# Patient Record
Sex: FEMALE | Race: ASIAN | NOT HISPANIC OR LATINO | Employment: FULL TIME | URBAN - METROPOLITAN AREA
[De-identification: names, ages, dates, MRNs, and addresses within clinical notes are randomized per-mention and may not be internally consistent; named-entity substitution may affect disease eponyms.]

---

## 2021-03-11 ENCOUNTER — IMMUNIZATION (OUTPATIENT)
Dept: NURSING | Facility: CLINIC | Age: 36
End: 2021-03-11
Payer: COMMERCIAL

## 2021-03-11 PROCEDURE — 0001A PR COVID VAC PFIZER DIL RECON 30 MCG/0.3 ML IM: CPT

## 2021-03-11 PROCEDURE — 91300 PR COVID VAC PFIZER DIL RECON 30 MCG/0.3 ML IM: CPT

## 2021-04-01 ENCOUNTER — IMMUNIZATION (OUTPATIENT)
Dept: NURSING | Facility: CLINIC | Age: 36
End: 2021-04-01
Payer: COMMERCIAL

## 2021-04-01 PROCEDURE — 0002A PR COVID VAC PFIZER DIL RECON 30 MCG/0.3 ML IM: CPT

## 2021-04-01 PROCEDURE — 91300 PR COVID VAC PFIZER DIL RECON 30 MCG/0.3 ML IM: CPT

## 2021-04-24 ENCOUNTER — HEALTH MAINTENANCE LETTER (OUTPATIENT)
Age: 36
End: 2021-04-24

## 2021-10-03 ENCOUNTER — HEALTH MAINTENANCE LETTER (OUTPATIENT)
Age: 36
End: 2021-10-03

## 2022-02-28 ENCOUNTER — HOSPITAL ENCOUNTER (INPATIENT)
Facility: CLINIC | Age: 37
LOS: 3 days | Discharge: HOME | DRG: 389 | End: 2022-03-03
Attending: EMERGENCY MEDICINE | Admitting: INTERNAL MEDICINE
Payer: COMMERCIAL

## 2022-02-28 ENCOUNTER — ANCILLARY PROCEDURE (OUTPATIENT)
Dept: ULTRASOUND IMAGING | Facility: CLINIC | Age: 37
End: 2022-02-28
Attending: EMERGENCY MEDICINE
Payer: COMMERCIAL

## 2022-02-28 ENCOUNTER — APPOINTMENT (OUTPATIENT)
Dept: CT IMAGING | Facility: CLINIC | Age: 37
DRG: 389 | End: 2022-02-28
Attending: EMERGENCY MEDICINE
Payer: COMMERCIAL

## 2022-02-28 DIAGNOSIS — I95.89 CHRONIC LOW BLOOD PRESSURE: ICD-10-CM

## 2022-02-28 DIAGNOSIS — K56.609 SMALL BOWEL OBSTRUCTION (H): ICD-10-CM

## 2022-02-28 LAB
ABO/RH(D): NORMAL
ALBUMIN SERPL-MCNC: 4.2 G/DL (ref 3.4–5)
ALP SERPL-CCNC: 47 U/L (ref 40–150)
ALT SERPL W P-5'-P-CCNC: 19 U/L (ref 0–50)
ANION GAP SERPL CALCULATED.3IONS-SCNC: 6 MMOL/L (ref 3–14)
ANTIBODY SCREEN: NEGATIVE
AST SERPL W P-5'-P-CCNC: 15 U/L (ref 0–45)
B-HCG FREE SERPL-ACNC: <5 IU/L (ref 0–5)
BASOPHILS # BLD AUTO: 0 10E3/UL (ref 0–0.2)
BASOPHILS NFR BLD AUTO: 0 %
BILIRUB SERPL-MCNC: 0.4 MG/DL (ref 0.2–1.3)
BUN SERPL-MCNC: 11 MG/DL (ref 7–30)
CALCIUM SERPL-MCNC: 9.5 MG/DL (ref 8.5–10.1)
CHLORIDE BLD-SCNC: 104 MMOL/L (ref 94–109)
CO2 SERPL-SCNC: 27 MMOL/L (ref 20–32)
CREAT BLD-MCNC: 0.5 MG/DL (ref 0.5–1)
CREAT SERPL-MCNC: 0.59 MG/DL (ref 0.52–1.04)
EOSINOPHIL # BLD AUTO: 0.1 10E3/UL (ref 0–0.7)
EOSINOPHIL NFR BLD AUTO: 1 %
ERYTHROCYTE [DISTWIDTH] IN BLOOD BY AUTOMATED COUNT: 14.1 % (ref 10–15)
GFR SERPL CREATININE-BSD FRML MDRD: >60 ML/MIN/1.73M2
GFR SERPL CREATININE-BSD FRML MDRD: >90 ML/MIN/1.73M2
GLUCOSE BLD-MCNC: 100 MG/DL (ref 70–99)
HCT VFR BLD AUTO: 35.5 % (ref 35–47)
HGB BLD-MCNC: 11.4 G/DL (ref 11.7–15.7)
HOLD SPECIMEN: NORMAL
HOLD SPECIMEN: NORMAL
IMM GRANULOCYTES # BLD: 0 10E3/UL
IMM GRANULOCYTES NFR BLD: 0 %
LACTATE SERPL-SCNC: 1.1 MMOL/L (ref 0.7–2)
LIPASE SERPL-CCNC: 103 U/L (ref 73–393)
LYMPHOCYTES # BLD AUTO: 1.7 10E3/UL (ref 0.8–5.3)
LYMPHOCYTES NFR BLD AUTO: 18 %
MCH RBC QN AUTO: 27 PG (ref 26.5–33)
MCHC RBC AUTO-ENTMCNC: 32.1 G/DL (ref 31.5–36.5)
MCV RBC AUTO: 84 FL (ref 78–100)
MONOCYTES # BLD AUTO: 0.4 10E3/UL (ref 0–1.3)
MONOCYTES NFR BLD AUTO: 4 %
NEUTROPHILS # BLD AUTO: 7.3 10E3/UL (ref 1.6–8.3)
NEUTROPHILS NFR BLD AUTO: 77 %
NRBC # BLD AUTO: 0 10E3/UL
NRBC BLD AUTO-RTO: 0 /100
PLATELET # BLD AUTO: 308 10E3/UL (ref 150–450)
POTASSIUM BLD-SCNC: 3.6 MMOL/L (ref 3.4–5.3)
PROT SERPL-MCNC: 8.1 G/DL (ref 6.8–8.8)
RBC # BLD AUTO: 4.23 10E6/UL (ref 3.8–5.2)
SARS-COV-2 RNA RESP QL NAA+PROBE: NEGATIVE
SODIUM SERPL-SCNC: 137 MMOL/L (ref 133–144)
SPECIMEN EXPIRATION DATE: NORMAL
WBC # BLD AUTO: 9.6 10E3/UL (ref 4–11)

## 2022-02-28 PROCEDURE — 87635 SARS-COV-2 COVID-19 AMP PRB: CPT | Performed by: EMERGENCY MEDICINE

## 2022-02-28 PROCEDURE — 99285 EMERGENCY DEPT VISIT HI MDM: CPT | Mod: 25

## 2022-02-28 PROCEDURE — 96376 TX/PRO/DX INJ SAME DRUG ADON: CPT

## 2022-02-28 PROCEDURE — 250N000011 HC RX IP 250 OP 636: Performed by: EMERGENCY MEDICINE

## 2022-02-28 PROCEDURE — C9803 HOPD COVID-19 SPEC COLLECT: HCPCS

## 2022-02-28 PROCEDURE — 36415 COLL VENOUS BLD VENIPUNCTURE: CPT | Performed by: EMERGENCY MEDICINE

## 2022-02-28 PROCEDURE — 99223 1ST HOSP IP/OBS HIGH 75: CPT | Mod: AI | Performed by: NURSE PRACTITIONER

## 2022-02-28 PROCEDURE — 86850 RBC ANTIBODY SCREEN: CPT | Performed by: EMERGENCY MEDICINE

## 2022-02-28 PROCEDURE — 258N000003 HC RX IP 258 OP 636: Performed by: EMERGENCY MEDICINE

## 2022-02-28 PROCEDURE — 86901 BLOOD TYPING SEROLOGIC RH(D): CPT | Performed by: EMERGENCY MEDICINE

## 2022-02-28 PROCEDURE — 82565 ASSAY OF CREATININE: CPT

## 2022-02-28 PROCEDURE — 83605 ASSAY OF LACTIC ACID: CPT | Performed by: EMERGENCY MEDICINE

## 2022-02-28 PROCEDURE — 84702 CHORIONIC GONADOTROPIN TEST: CPT

## 2022-02-28 PROCEDURE — 96375 TX/PRO/DX INJ NEW DRUG ADDON: CPT

## 2022-02-28 PROCEDURE — 76705 ECHO EXAM OF ABDOMEN: CPT

## 2022-02-28 PROCEDURE — 80053 COMPREHEN METABOLIC PANEL: CPT | Performed by: EMERGENCY MEDICINE

## 2022-02-28 PROCEDURE — 85025 COMPLETE CBC W/AUTO DIFF WBC: CPT | Performed by: EMERGENCY MEDICINE

## 2022-02-28 PROCEDURE — 96374 THER/PROPH/DIAG INJ IV PUSH: CPT

## 2022-02-28 PROCEDURE — 82040 ASSAY OF SERUM ALBUMIN: CPT | Performed by: EMERGENCY MEDICINE

## 2022-02-28 PROCEDURE — 96361 HYDRATE IV INFUSION ADD-ON: CPT

## 2022-02-28 PROCEDURE — 74177 CT ABD & PELVIS W/CONTRAST: CPT

## 2022-02-28 PROCEDURE — 81001 URINALYSIS AUTO W/SCOPE: CPT | Performed by: EMERGENCY MEDICINE

## 2022-02-28 PROCEDURE — 120N000001 HC R&B MED SURG/OB

## 2022-02-28 PROCEDURE — 250N000009 HC RX 250: Performed by: EMERGENCY MEDICINE

## 2022-02-28 PROCEDURE — 83690 ASSAY OF LIPASE: CPT | Performed by: EMERGENCY MEDICINE

## 2022-02-28 RX ORDER — IOPAMIDOL 755 MG/ML
500 INJECTION, SOLUTION INTRAVASCULAR ONCE
Status: COMPLETED | OUTPATIENT
Start: 2022-02-28 | End: 2022-02-28

## 2022-02-28 RX ORDER — ONDANSETRON 2 MG/ML
4 INJECTION INTRAMUSCULAR; INTRAVENOUS ONCE
Status: COMPLETED | OUTPATIENT
Start: 2022-02-28 | End: 2022-02-28

## 2022-02-28 RX ORDER — SODIUM CHLORIDE 9 MG/ML
INJECTION, SOLUTION INTRAVENOUS ONCE
Status: COMPLETED | OUTPATIENT
Start: 2022-02-28 | End: 2022-03-01

## 2022-02-28 RX ORDER — HYDROMORPHONE HYDROCHLORIDE 1 MG/ML
0.5 INJECTION, SOLUTION INTRAMUSCULAR; INTRAVENOUS; SUBCUTANEOUS EVERY 30 MIN PRN
Status: DISCONTINUED | OUTPATIENT
Start: 2022-02-28 | End: 2022-03-03 | Stop reason: HOSPADM

## 2022-02-28 RX ADMIN — SODIUM CHLORIDE 1000 ML: 9 INJECTION, SOLUTION INTRAVENOUS at 19:04

## 2022-02-28 RX ADMIN — HYDROMORPHONE HYDROCHLORIDE 0.5 MG: 1 INJECTION, SOLUTION INTRAMUSCULAR; INTRAVENOUS; SUBCUTANEOUS at 23:37

## 2022-02-28 RX ADMIN — SODIUM CHLORIDE 59 ML: 9 INJECTION, SOLUTION INTRAVENOUS at 18:36

## 2022-02-28 RX ADMIN — SODIUM CHLORIDE: 9 INJECTION, SOLUTION INTRAVENOUS at 20:25

## 2022-02-28 RX ADMIN — HYDROMORPHONE HYDROCHLORIDE 0.5 MG: 1 INJECTION, SOLUTION INTRAMUSCULAR; INTRAVENOUS; SUBCUTANEOUS at 21:13

## 2022-02-28 RX ADMIN — ONDANSETRON 4 MG: 2 INJECTION INTRAMUSCULAR; INTRAVENOUS at 18:06

## 2022-02-28 RX ADMIN — HYDROMORPHONE HYDROCHLORIDE 1 MG: 1 INJECTION, SOLUTION INTRAMUSCULAR; INTRAVENOUS; SUBCUTANEOUS at 18:19

## 2022-02-28 RX ADMIN — IOPAMIDOL 74 ML: 755 INJECTION, SOLUTION INTRAVENOUS at 18:35

## 2022-02-28 RX ADMIN — SODIUM CHLORIDE 1000 ML: 9 INJECTION, SOLUTION INTRAVENOUS at 18:19

## 2022-02-28 RX ADMIN — ONDANSETRON 4 MG: 2 INJECTION INTRAMUSCULAR; INTRAVENOUS at 23:55

## 2022-02-28 ASSESSMENT — ACTIVITIES OF DAILY LIVING (ADL)
ADLS_ACUITY_SCORE: 12

## 2022-02-28 NOTE — ED PROVIDER NOTES
History     Chief Complaint:  Abdominal Pain and Vomiting       HPI   Franci Wu is a 36 year old female who presents with her  for evaluation of sudden onset 101/10 upper abdominal pain that started around 1:00-1:30pm today.  Not associated with activity or eating.  Tried omeprazole without relief.  Vomited a few times.  Seemed distended which improved after vomiting.  No diarrhea.  No fever, cough, shortness of breath, chest pain.  Patient is moaning.  Most of history provided by  due to level of patient's pain.  No previous abdominal or pelvic surgeries.  No previous occurrence of symptoms.  No associated urinary symptoms.  Vaginal spotting for 4-5 days and it is not time for her period.  Feeling dizzy/lightheaded.    ROS:  Review of Systems  Ten system ROS reviewed and is negative except as above      Allergies:  Minocycline     Medications:    acetaminophen 650 MG TABS  Ferrous Sulfate (IRON SUPPLEMENT PO)  ibuprofen (ADVIL,MOTRIN) 400 MG tablet  oxyCODONE (ROXICODONE) 5 MG immediate release tablet  Prenatal Vit w/Vw-Zucqyhbqw-NN (PNV PO)  VITAMIN D, CHOLECALCIFEROL, PO        Past Medical History:    Past Medical History:   Diagnosis Date     Anemia      Patient Active Problem List   Diagnosis     Indication for care in labor or delivery        Past Surgical History:    Denies      Social History:   reports that she has never smoked. She does not have any smokeless tobacco history on file. She reports that she does not drink alcohol and does not use drugs.  PCP: Alessandra Verde     Physical Exam     Patient Vitals for the past 24 hrs:   BP Temp Temp src Pulse Resp SpO2   02/28/22 1755 (!) 86/33 -- -- -- -- --   02/28/22 1749 -- 97.2  F (36.2  C) Temporal 70 12 100 %        Physical Exam  Eyes:  Sclera white; Pupils are equal and round  ENT:    External ears and nares normal  CV:  Rate as above with regular rhythm   Resp:  Breath sounds clear and equal bilaterally    Non-labored, no  retractions or accessory muscle use  GI:  Abdomen is soft, maximal tenderness epigastric and RUQ with light palpation, diffuse tenderness with deeper palpation    No rebound tenderness or peritoneal features  MS:  Moves all extremities  Skin:  Warm and dry  Neuro:  Speech is normal and fluent. Moaning.    Emergency Department Course       Imaging:  CT Abdomen Pelvis w Contrast   Final Result   IMPRESSION:    1.  Finding suggests small bowel obstruction with multiple dilated proximal to mid small bowel loops and decompressed distal small bowel. Transition to decompression at the right lower quadrant noted where there are a few wall thickened small bowel    loops. This could relate to an infectious or inflammatory enteritis versus other etiologies.   2.  Small pelvic fluid.      POC US ABDOMEN LIMITED   Final Result   Limited Bedside Abdominal Ultrasound      Performed by: Dr. Matamoros   Indication: Acute pain; hypotensive   Body area(s) imaged: RUQ, LUQ, pelvic windows   Findings: No free fluid, limited single view of gallbladder without pericholecystic fluid or gallstones, no hydronephrosis   Impression: Same   Images archived to PACS            Report per radiology    Laboratory:  Labs Ordered and Resulted from Time of ED Arrival to Time of ED Departure   COMPREHENSIVE METABOLIC PANEL - Abnormal       Result Value    Sodium 137      Potassium 3.6      Chloride 104      Carbon Dioxide (CO2) 27      Anion Gap 6      Urea Nitrogen 11      Creatinine 0.59      Calcium 9.5      Glucose 100 (*)     Alkaline Phosphatase 47      AST 15      ALT 19      Protein Total 8.1      Albumin 4.2      Bilirubin Total 0.4      GFR Estimate >90     CBC WITH PLATELETS AND DIFFERENTIAL - Abnormal    WBC Count 9.6      RBC Count 4.23      Hemoglobin 11.4 (*)     Hematocrit 35.5      MCV 84      MCH 27.0      MCHC 32.1      RDW 14.1      Platelet Count 308      % Neutrophils 77      % Lymphocytes 18      % Monocytes 4      % Eosinophils 1       % Basophils 0      % Immature Granulocytes 0      NRBCs per 100 WBC 0      Absolute Neutrophils 7.3      Absolute Lymphocytes 1.7      Absolute Monocytes 0.4      Absolute Eosinophils 0.1      Absolute Basophils 0.0      Absolute Immature Granulocytes 0.0      Absolute NRBCs 0.0     LIPASE - Normal    Lipase 103     LACTIC ACID WHOLE BLOOD - Normal    Lactic Acid 1.1     COVID-19 VIRUS (CORONAVIRUS) BY PCR - Normal    SARS CoV2 PCR Negative     ISTAT CREATININE POCT - Normal    Creatinine POCT 0.5      GFR, ESTIMATED POCT >60     ISTAT HCG QUANTITATIVE PREGNANCY POCT - Normal    HCG QUANTITATIVE POCT <5.0     ROUTINE UA WITH MICROSCOPIC   TYPE AND SCREEN, ADULT    ABO/RH(D) A POS      Antibody Screen Negative      SPECIMEN EXPIRATION DATE [id number]     ABO/RH TYPE AND SCREEN        Procedures   POC US as above    Emergency Department Course:    Interventions:  Medications   HYDROmorphone (PF) (DILAUDID) injection 0.5 mg (0.5 mg Intravenous Given 2/28/22 2113)   0.9% sodium chloride BOLUS (0 mLs Intravenous Stopped 2/28/22 1900)   HYDROmorphone (DILAUDID) injection 1 mg (1 mg Intravenous Given 2/28/22 1819)   ondansetron (ZOFRAN) injection 4 mg (4 mg Intravenous Given 2/28/22 1806)   CT Scan Flush (59 mLs Intravenous Given 2/28/22 1836)   iopamidol (ISOVUE-370) solution 500 mL (74 mLs Intravenous Given 2/28/22 1835)   0.9% sodium chloride BOLUS (0 mLs Intravenous Stopped 2/28/22 2113)   sodium chloride 0.9% infusion ( Intravenous Rate/Dose Verify 2/28/22 2259)        Disposition:  The patient was admitted to the hospital under the care of Dr. Peñaloza, I spoke with Ulysses Garrett NP at 7:52pm who also assessed the patient in the ED at 8:00pm prior to admission.     Impression & Plan      Medical Decision Making:  Roomed to fast track due to it being the only available room and she is hypotensive on arrival.  Fluids and labs started.  Zofran and Dilaudid ordered.  Istat HCG negative ruling out ectopic as an etiology  of symptoms.  POC US without free fluid or visualized hydronephrosis.  Gallbladder images limited, single view without obvious wall thickening and no visualized stone.  CT concerning for small bowel obstruction.  No history of abdominal or pelvic surgeries, so is not anticipated to have adhesions as a source.  No mass noted in the area.  Has not vomited while in ED and is not distended.  Will keep NPO and on IV fluids.  If vomiting or distension worsen, then NG tube.  On review of CareEverywhere patient has past blood pressures in the 90s at regular visits, 98/62 on 3/18/21 during a visit for her knee, 97/64 on 1/15/20 during a visit for irregular periods, and 101/67 on 8/28/15 at a post-partum visit.  Lowest BPs here were 70s.  These have improved after IV fluids.  Given her past history of chronic low blood pressures without acute sepsis or bleeding, is felt by myself and the hospitalist team to be appropriate for med/surg admission.      Signed out to Dr. Madrid pending inpatient bed availability.  Boarding anticipated.    Diagnosis:    ICD-10-CM    1. Small bowel obstruction (H)  K56.609    2. Chronic low blood pressure  I95.89            Aishwarya Matamoros MD  02/28/22 2242

## 2022-02-28 NOTE — ED TRIAGE NOTES
Pt presents with upper mid abdominal pain that began today around 1300. Since, pt has had 3 episodes of vomiting. Denies diarrhea. Dizzy/lightheaded and feels like passing out. Has been spotting x4-5 days, not on period.

## 2022-03-01 ENCOUNTER — APPOINTMENT (OUTPATIENT)
Dept: GENERAL RADIOLOGY | Facility: CLINIC | Age: 37
DRG: 389 | End: 2022-03-01
Attending: SURGERY
Payer: COMMERCIAL

## 2022-03-01 PROBLEM — I95.89 CHRONIC LOW BLOOD PRESSURE: Status: ACTIVE | Noted: 2022-03-01

## 2022-03-01 LAB
ALBUMIN UR-MCNC: 10 MG/DL
APPEARANCE UR: CLEAR
BILIRUB UR QL STRIP: NEGATIVE
COLOR UR AUTO: ABNORMAL
GLUCOSE UR STRIP-MCNC: NEGATIVE MG/DL
HGB UR QL STRIP: ABNORMAL
KETONES UR STRIP-MCNC: 80 MG/DL
LEUKOCYTE ESTERASE UR QL STRIP: ABNORMAL
MUCOUS THREADS #/AREA URNS LPF: PRESENT /LPF
NITRATE UR QL: NEGATIVE
PH UR STRIP: 7 [PH] (ref 5–7)
RBC URINE: 3 /HPF
SP GR UR STRIP: 1 (ref 1–1.03)
SQUAMOUS EPITHELIAL: 3 /HPF
UROBILINOGEN UR STRIP-MCNC: NORMAL MG/DL
WBC URINE: 11 /HPF

## 2022-03-01 PROCEDURE — 250N000011 HC RX IP 250 OP 636: Performed by: NURSE PRACTITIONER

## 2022-03-01 PROCEDURE — 74018 RADEX ABDOMEN 1 VIEW: CPT

## 2022-03-01 PROCEDURE — 258N000003 HC RX IP 258 OP 636: Performed by: HOSPITALIST

## 2022-03-01 PROCEDURE — 120N000001 HC R&B MED SURG/OB

## 2022-03-01 PROCEDURE — 258N000003 HC RX IP 258 OP 636: Performed by: INTERNAL MEDICINE

## 2022-03-01 PROCEDURE — 250N000009 HC RX 250: Performed by: HOSPITALIST

## 2022-03-01 PROCEDURE — 99233 SBSQ HOSP IP/OBS HIGH 50: CPT | Performed by: HOSPITALIST

## 2022-03-01 PROCEDURE — 250N000011 HC RX IP 250 OP 636: Performed by: HOSPITALIST

## 2022-03-01 PROCEDURE — 99222 1ST HOSP IP/OBS MODERATE 55: CPT | Performed by: SURGERY

## 2022-03-01 PROCEDURE — 250N000011 HC RX IP 250 OP 636: Performed by: EMERGENCY MEDICINE

## 2022-03-01 PROCEDURE — 96376 TX/PRO/DX INJ SAME DRUG ADON: CPT

## 2022-03-01 RX ORDER — DEXTROSE MONOHYDRATE, SODIUM CHLORIDE, AND POTASSIUM CHLORIDE 50; 1.49; 4.5 G/1000ML; G/1000ML; G/1000ML
INJECTION, SOLUTION INTRAVENOUS CONTINUOUS
Status: ACTIVE | OUTPATIENT
Start: 2022-03-01 | End: 2022-03-02

## 2022-03-01 RX ORDER — CEFTRIAXONE 1 G/1
1 INJECTION, POWDER, FOR SOLUTION INTRAMUSCULAR; INTRAVENOUS EVERY 24 HOURS
Status: DISCONTINUED | OUTPATIENT
Start: 2022-03-01 | End: 2022-03-03

## 2022-03-01 RX ORDER — LIDOCAINE 40 MG/G
CREAM TOPICAL
Status: DISCONTINUED | OUTPATIENT
Start: 2022-03-01 | End: 2022-03-03 | Stop reason: HOSPADM

## 2022-03-01 RX ORDER — ONDANSETRON 2 MG/ML
4 INJECTION INTRAMUSCULAR; INTRAVENOUS EVERY 6 HOURS PRN
Status: DISCONTINUED | OUTPATIENT
Start: 2022-03-01 | End: 2022-03-03 | Stop reason: HOSPADM

## 2022-03-01 RX ORDER — SODIUM CHLORIDE, SODIUM LACTATE, POTASSIUM CHLORIDE, CALCIUM CHLORIDE 600; 310; 30; 20 MG/100ML; MG/100ML; MG/100ML; MG/100ML
INJECTION, SOLUTION INTRAVENOUS CONTINUOUS
Status: DISCONTINUED | OUTPATIENT
Start: 2022-03-01 | End: 2022-03-01

## 2022-03-01 RX ORDER — ACETAMINOPHEN 325 MG/1
975 TABLET ORAL EVERY 8 HOURS PRN
Status: DISCONTINUED | OUTPATIENT
Start: 2022-03-01 | End: 2022-03-03 | Stop reason: HOSPADM

## 2022-03-01 RX ORDER — SODIUM CHLORIDE 9 MG/ML
INJECTION, SOLUTION INTRAVENOUS CONTINUOUS
Status: DISCONTINUED | OUTPATIENT
Start: 2022-03-01 | End: 2022-03-01

## 2022-03-01 RX ORDER — KETOROLAC TROMETHAMINE 30 MG/ML
30 INJECTION, SOLUTION INTRAMUSCULAR; INTRAVENOUS EVERY 6 HOURS PRN
Status: DISCONTINUED | OUTPATIENT
Start: 2022-03-01 | End: 2022-03-03 | Stop reason: HOSPADM

## 2022-03-01 RX ADMIN — FAMOTIDINE 20 MG: 10 INJECTION, SOLUTION INTRAVENOUS at 11:49

## 2022-03-01 RX ADMIN — FAMOTIDINE 20 MG: 10 INJECTION, SOLUTION INTRAVENOUS at 23:54

## 2022-03-01 RX ADMIN — SODIUM CHLORIDE: 9 INJECTION, SOLUTION INTRAVENOUS at 10:37

## 2022-03-01 RX ADMIN — SODIUM CHLORIDE: 9 INJECTION, SOLUTION INTRAVENOUS at 16:22

## 2022-03-01 RX ADMIN — CEFTRIAXONE 1 G: 1 INJECTION, POWDER, FOR SOLUTION INTRAMUSCULAR; INTRAVENOUS at 10:46

## 2022-03-01 RX ADMIN — HYDROMORPHONE HYDROCHLORIDE 0.5 MG: 1 INJECTION, SOLUTION INTRAMUSCULAR; INTRAVENOUS; SUBCUTANEOUS at 03:21

## 2022-03-01 RX ADMIN — HYDROMORPHONE HYDROCHLORIDE 0.5 MG: 1 INJECTION, SOLUTION INTRAMUSCULAR; INTRAVENOUS; SUBCUTANEOUS at 05:33

## 2022-03-01 RX ADMIN — POTASSIUM CHLORIDE, DEXTROSE MONOHYDRATE AND SODIUM CHLORIDE: 150; 5; 450 INJECTION, SOLUTION INTRAVENOUS at 17:34

## 2022-03-01 RX ADMIN — SODIUM CHLORIDE 1000 ML: 9 INJECTION, SOLUTION INTRAVENOUS at 11:06

## 2022-03-01 RX ADMIN — ONDANSETRON 4 MG: 2 INJECTION INTRAMUSCULAR; INTRAVENOUS at 16:25

## 2022-03-01 RX ADMIN — SODIUM CHLORIDE, POTASSIUM CHLORIDE, SODIUM LACTATE AND CALCIUM CHLORIDE: 600; 310; 30; 20 INJECTION, SOLUTION INTRAVENOUS at 09:23

## 2022-03-01 RX ADMIN — KETOROLAC TROMETHAMINE 30 MG: 30 INJECTION, SOLUTION INTRAMUSCULAR at 16:26

## 2022-03-01 RX ADMIN — DIATRIZOATE MEGLUMINE AND DIATRIZOATE SODIUM 75 ML: 660; 100 SOLUTION ORAL; RECTAL at 15:48

## 2022-03-01 RX ADMIN — SODIUM CHLORIDE 1000 ML: 9 INJECTION, SOLUTION INTRAVENOUS at 07:07

## 2022-03-01 ASSESSMENT — ACTIVITIES OF DAILY LIVING (ADL)
ADLS_ACUITY_SCORE: 12
FALL_HISTORY_WITHIN_LAST_SIX_MONTHS: NO
DOING_ERRANDS_INDEPENDENTLY_DIFFICULTY: NO
ADLS_ACUITY_SCORE: 12
ADLS_ACUITY_SCORE: 3
TOILETING_ISSUES: NO
CHANGE_IN_FUNCTIONAL_STATUS_SINCE_ONSET_OF_CURRENT_ILLNESS/INJURY: NO
ADLS_ACUITY_SCORE: 12
ADLS_ACUITY_SCORE: 5
WALKING_OR_CLIMBING_STAIRS_DIFFICULTY: NO
ADLS_ACUITY_SCORE: 12
WEAR_GLASSES_OR_BLIND: NO
DIFFICULTY_EATING/SWALLOWING: NO
ADLS_ACUITY_SCORE: 5
HEARING_DIFFICULTY_OR_DEAF: NO
ADLS_ACUITY_SCORE: 12
DIFFICULTY_COMMUNICATING: NO
ADLS_ACUITY_SCORE: 12
ADLS_ACUITY_SCORE: 3
ADLS_ACUITY_SCORE: 5
ADLS_ACUITY_SCORE: 5
CONCENTRATING,_REMEMBERING_OR_MAKING_DECISIONS_DIFFICULTY: NO
ADLS_ACUITY_SCORE: 5
ADLS_ACUITY_SCORE: 5
DRESSING/BATHING_DIFFICULTY: NO
ADLS_ACUITY_SCORE: 12

## 2022-03-01 NOTE — H&P
[facility]    History and Physical - Hospitalist Service       Date of Admission:  2/28/2022    Summary: Admitted due to acute abdominal pain today with N/V that initially relieved pain, but now makes worse.  Endorses BM yesterday, no flatus/BM today.       Assessment & Plan          # Acute abdominal pain  # Suspected SBO  # RLQ wall thickening on CT  Reports doing well yesterday and this AM, acute issues starting this afternoon, after lunch.  Notes WNL BM's yesterday, no black/red stools, no BM/Flatus today.  Endorses N/V today with just food, no blood.  Is active, denies being sedentary.  Etiology unclear, CT notes findings suggest SBO, with RLQ wall thickening in some small bowel loops, but afebrile, no diarrhea.   I note CT does show moderate stool burden, constipation?    -Currently NPO.   -Currently on IVF's.     *Due to hospital census, patient will remain housed in ER for now, transition to floor time unknown.   -Once on floor, reassess abdomen.   -Consider NGT and or Bisacodyl suppository for compression.   -Ambulation schedule.   --If not improved, consider Gen Surg review and/or Gastrografin enema.   -PRN Acetaminophen and PRN IV Dilaudid ordered/held.     # Hypotensive  Initially SBP's were 70's and she endorsed light headedness/dizziness, but improved with IVF's.  In review of EMR, SBP of 90's is her baseline.  Lactate WNL, WNL WBC, afebrile, so lower suspicion for infectious etiology at this time.   -Continue IVF's.   -Continue to clinically monitor.     # Vaginal spotting  Endorses some vaginal spotting, has a history of vaginal spotting.  Reports some dysuria a few days ago, none lately.  UA is unremarkable.   -No changes, continue to clinically monitor.     Diet: NPO for Medical/Clinical Reasons Except for: Ice Chips    DVT Prophylaxis: Low Risk/Ambulatory with no VTE prophylaxis indicated  [place]iff Catheter: Not present  Central Lines: None  Code Status:   Full     Disposition  Plan    -Return home once medically improved.     The patient's care was discussed with the Attending Physician, Dr. Aguilar.     Signed:     KEMAR Hyde [facility]  Hospitalist Service  [facility]  Securely message with the Vocera Web Console (learn more here)  Text page via [facility] Paging/Directory     Chief Complaint   History is obtained from the patient and .     Acute abdominal pain starting this afternoon with N/V.     History of Present Illness   Franci Wu is a 36 year old female who has minimal PMH to include pregnancy and vaginal spotting.  Reports no issues yesterday and this morning, had lunch.  Starting around 1 pm, acute, severe abdominal pain and N/V.  Initially N/V helped pain, but last few times, was worse.  No melena, BRBPR, no blood in emesis.  Reports dysuria a few days ago, none now.  Does endorse some mild vaginal spotting.  Reports she has never had this issue before.     In meeting Franci Wu she reports no food since lunch.  Reports the IV Dilaudid really helped her abdominal pain.  Endorses no flatus/BM today.  Besides her abdominal pain, she did endorse some mild light headedness this morning.  No other complaints.      Past Medical History    I have reviewed this patient's medical history and updated it with pertinent information if needed.   Past Medical History:   Diagnosis Date     Anemia        Past Surgical History   I have reviewed this patient's surgical history and updated it with pertinent information if needed.  No past surgical history on file.    Prior to Admission Medications   Prior to Admission Medications   Prescriptions Last Dose Informant Patient Reported? Taking?   Multiple Vitamin (MULTIVITAMIN ADULT PO)   Yes Yes   Sig: Take 1 tablet by mouth daily      Facility-Administered Medications: None       Allergies   Allergies   Allergen Reactions     Minocycline Swelling       Social History   I have reviewed this patient's social history and  updated it with pertinent information if needed.  Social History     Tobacco Use     Smoking status: Never Smoker     Smokeless tobacco: Not on file   Substance Use Topics     Alcohol use: No     Drug use: No       Review of Systems:  10 point ROS done including, light headedness/dizziness, fever/chills, pain, Resp, CV, GI, and  and is negative other than noted in HPI.     Objective Data:   Vital Signs: Temp: 97.2  F (36.2  C) Temp src: Temporal BP: (!) 88/48 Pulse: 54   Resp: 12 SpO2: 100 % O2 Device: None (Room air)    Weight: 0 lbs 0 oz    Physical Exam:  GENERAL APPEARANCE:  Younger thin female resting in bed, NAD, non-toxic.  ENT:  Mouth and oropharynx normal, moist mucous membranes.   RESP:  Lungs CTA.  Regular relaxed breathing effort.  No cough.   CV: S1/S2 no murmur or rubs.  Regular rhythm and rate.  No generalized edema.   ABDOMEN:  Flat, slightly distended, mild tenderness with deep palpation but soft, with only a few bowel sounds.  No guarding, rigidity, or rebound tenderness.  EXTREMITIES:  No lower extremity edema, no calf tenderness.   PSYCH: Alert and orientated, pleasant and cooperative.     Data reviewed today: I reviewed all medications, new labs and imaging results over the last 24 hours. I personally reviewed lab's and CT from ER/admission.      End:

## 2022-03-01 NOTE — CONSULTS
"[facility] Surgery Consultation    Franci Wu MRN# [id number]   Age: 36 year old YOB: 1985     Date of Admission:  2/28/2022    Reason for consult: Abdominal pain       Requesting physician: David       Level of consult: Consult, follow and place orders           Assessment and Plan:   Assessment:   abdominal pain possible SBO/partial SBO  Patient Active Problem List    Diagnosis Date Noted     Chronic low blood pressure 03/01/2022     Priority: Medium     Small bowel obstruction (H) 02/28/2022     Priority: Medium     Indication for care in labor or delivery 07/17/2015     Priority: Medium         Plan:   Gastrografin challenge discussed with pt/ - they would like to procede  Abdominal films as needed  Possible laparoscopy/laparotomy discussed with pt  Will involve GI with wall thickening (and pt/ request)            Chief Complaint:   Abdominal pain     History is obtained from the patient and electronic health record         History of Present Illness:   This patient is a 36 year old female without a significant past medical history who presents with the following condition requiring a hospital admission: abdominal pain. She reports gagnon began last yesterday. They had some meat to eat and then used metamucil after this. She reports pain is improved and that she is passing gas here in the ER. She feels she is improved (not just pain relief from medication). Her  feels she is improved as she is much more \"jovial\". No prior similar SX. Her father had a similar presentation for SBO which resolved non-operatively.no FH of Crohn's/UC          Past Medical History:     Past Medical History:   Diagnosis Date     Anemia              Past Surgical History:   No past surgical history on file.          Social History:     Social History     Tobacco Use     Smoking status: Never Smoker     Smokeless tobacco: Not on file   Substance Use Topics     Alcohol use: No             Family " History:   No family history on file.          Immunizations:     VACCINE/DOSE   Diptheria   DPT   DTAP   HBIG   Hepatitis A   Hepatitis B   HIB   Influenza   Measles   Meningococcal   MMR   Mumps   Pneumococcal   Polio   Rubella   Small Pox   TDAP   Varicella   Zoster             Allergies:     Allergies   Allergen Reactions     Minocycline Swelling             Medications:     Current Facility-Administered Medications   Medication     acetaminophen (TYLENOL) tablet 975 mg     cefTRIAXone (ROCEPHIN) 1 g vial to attach to  mL bag for ADULTS or NS 50 mL bag for PEDS     famotidine (PEPCID) injection 20 mg     HYDROmorphone (PF) (DILAUDID) injection 0.5 mg     ketorolac (TORADOL) injection 30 mg     lidocaine (LMX4) cream     lidocaine 1 % 0.1-1 mL     ondansetron (ZOFRAN) injection 4 mg     sodium chloride (PF) 0.9% PF flush 3 mL     sodium chloride (PF) 0.9% PF flush 3 mL     sodium chloride 0.9% infusion     Current Outpatient Medications   Medication Sig     Multiple Vitamin (MULTIVITAMIN ADULT PO) Take 1 tablet by mouth daily             Review of Systems:   CV: NEGATIVE for chest pain, palpitations or peripheral edema  C: NEGATIVE for fever, chills, change in weight  E/M: NEGATIVE for ear, mouth and throat problems  R: NEGATIVE for significant cough or SOB          Physical Exam:   All vitals have been reviewed  Patient Vitals for the past 24 hrs:   BP Temp Temp src Pulse Resp SpO2   03/01/22 1045 (!) 88/57 -- -- 67 -- 100 %   03/01/22 1030 90/58 -- -- 66 -- 100 %   03/01/22 1015 96/58 -- -- 71 -- 99 %   03/01/22 1000 (!) 85/55 -- -- 75 -- 99 %   03/01/22 0945 (!) 88/54 -- -- 72 -- 100 %   03/01/22 0930 (!) 88/55 -- -- 72 -- 100 %   03/01/22 0915 (!) 87/54 -- -- 67 -- 100 %   03/01/22 0900 95/61 -- -- 71 -- 100 %   03/01/22 0830 100/65 -- -- 66 -- 100 %   03/01/22 0815 98/63 -- -- 67 -- 100 %   03/01/22 0800 (!) 87/54 -- -- 68 -- 100 %   03/01/22 0745 (!) 83/52 -- -- 68 -- 100 %   03/01/22 0730 (!) 81/49  -- -- 66 -- 100 %   03/01/22 0728 -- -- -- -- -- 100 %   03/01/22 0715 (!) 79/45 -- -- 69 -- 100 %   03/01/22 0700 (!) 84/52 98  F (36.7  C) Oral 64 -- 100 %   03/01/22 0653 -- -- -- -- -- 100 %   03/01/22 0652 (!) 85/49 -- -- 64 -- --   03/01/22 0550 105/43 -- -- 71 -- 99 %   03/01/22 0530 (!) 87/63 -- -- 67 18 99 %   03/01/22 0500 (!) 85/55 -- -- 65 18 99 %   03/01/22 0445 (!) 85/56 -- -- 68 18 99 %   03/01/22 0430 99/57 -- -- 65 18 99 %   03/01/22 0415 (!) 87/65 -- -- 65 18 99 %   03/01/22 0300 (!) 81/55 -- -- 72 -- --   03/01/22 0230 (!) 86/63 -- -- 69 -- --   03/01/22 0215 90/61 -- -- 67 -- --   03/01/22 0200 (!) 86/56 -- -- 67 -- --   03/01/22 0130 (!) 87/55 -- -- 72 16 100 %   03/01/22 0100 (!) 84/56 -- -- 69 -- --   03/01/22 0045 (!) 89/58 -- -- 67 -- --   03/01/22 0030 (!) 88/58 -- -- 87 -- 100 %   03/01/22 0000 (!) 85/54 -- -- 69 16 100 %   02/28/22 2330 (!) 80/56 -- -- 74 -- --   02/28/22 2245 (!) 89/57 -- -- 70 -- --   02/28/22 2230 (!) 88/55 -- -- 68 -- --   02/28/22 2145 (!) 80/51 -- -- 63 -- --   02/28/22 2130 (!) 85/56 -- -- 66 -- --   02/28/22 2115 (!) 82/67 -- -- 76 -- --   02/28/22 2100 93/65 -- -- 78 16 --   02/28/22 2045 94/66 -- -- 80 -- --   02/28/22 2040 93/64 -- -- 83 -- --   02/28/22 2035 95/63 -- -- 81 -- --   02/28/22 1955 95/60 -- -- 83 -- 100 %   02/28/22 1950 91/60 -- -- 80 -- --   02/28/22 1945 (!) 85/56 -- -- 85 -- --   02/28/22 1940 (!) 89/59 -- -- 84 -- --   02/28/22 1935 (!) 81/70 -- -- 88 -- --   02/28/22 1930 (!) 88/48 -- -- 54 -- --   02/28/22 1925 (!) 73/48 -- -- 82 -- --   02/28/22 1920 (!) 71/42 -- -- 54 -- --   02/28/22 1915 (!) 73/43 -- -- 56 -- --   02/28/22 1910 (!) 76/44 -- -- 54 -- --   02/28/22 1905 (!) 76/43 -- -- 53 -- --   02/28/22 1820 109/74 -- -- 90 -- --   02/28/22 1815 95/80 -- -- 85 -- --   02/28/22 1810 (!) 80/56 -- -- 72 -- --   02/28/22 1805 103/62 -- -- 66 -- --   02/28/22 1800 (!) 88/45 -- -- 67 -- --   02/28/22 1755 (!) 86/33 -- -- -- -- --   02/28/22  1749 -- 97.2  F (36.2  C) Temporal 70 12 100 %     No intake or output data in the 24 hours ending 03/01/22 1341  Neck:   skin normal and no stridor     Chest / Breast:   Nl resp effort     Abdomen:   soft, non-distended, slight tenderness noted in the left upper quadrant, voluntary guarding absent and no masses palpated             Data:   All laboratory data reviewed  Results for orders placed or performed during the hospital encounter of 02/28/22   POC US ABDOMEN LIMITED     Status: None    Impression    Limited Bedside Abdominal Ultrasound    Performed by: Dr. Matamoros  Indication: Acute pain; hypotensive  Body area(s) imaged: RUQ, LUQ, pelvic windows  Findings: No free fluid, limited single view of gallbladder without pericholecystic fluid or gallstones, no hydronephrosis  Impression: Same  Images archived to PACS     CT Abdomen Pelvis w Contrast     Status: None    Narrative    EXAM: CT ABDOMEN PELVIS W CONTRAST  LOCATION: [facility]  DATE/TIME: 2/28/2022 6:34 PM    INDICATION: Abdominal abscess/infection suspected. Pain.  COMPARISON: None.  TECHNIQUE: CT scan of the abdomen and pelvis was performed following injection of IV contrast. Multiplanar reformats were obtained. Dose reduction techniques were used.  CONTRAST: 74mL Isovue 370    FINDINGS:   LOWER CHEST: Normal.    HEPATOBILIARY: Normal.    PANCREAS: Normal.    SPLEEN: Normal.    ADRENAL GLANDS: Normal.    KIDNEYS/BLADDER: Normal.    BOWEL: There are multiple dilated proximal to mid small bowel loops. Distal small bowel loops are decompressed. A few of the distal decompressed small bowel loops show areas of wall thickening suspected transition to decompression at the right lower   quadrant series 3 image 122. Colon is of normal caliber. Appendix is ill-defined but does not show conspicuous inflammation or enlargement.    LYMPH NODES: Normal.    VASCULATURE: Unremarkable.    PELVIC ORGANS: Small functional cysts at the right ovary.  Otherwise unremarkable pelvis. Trace pelvic fluid.    MUSCULOSKELETAL: Normal.      Impression    IMPRESSION:   1.  Finding suggests small bowel obstruction with multiple dilated proximal to mid small bowel loops and decompressed distal small bowel. Transition to decompression at the right lower quadrant noted where there are a few wall thickened small bowel   loops. This could relate to an infectious or inflammatory enteritis versus other etiologies.  2.  Small pelvic fluid.   Comprehensive metabolic panel     Status: Abnormal   Result Value Ref Range    Sodium 137 133 - 144 mmol/L    Potassium 3.6 3.4 - 5.3 mmol/L    Chloride 104 94 - 109 mmol/L    Carbon Dioxide (CO2) 27 20 - 32 mmol/L    Anion Gap 6 3 - 14 mmol/L    Urea Nitrogen 11 7 - 30 mg/dL    Creatinine 0.59 0.52 - 1.04 mg/dL    Calcium 9.5 8.5 - 10.1 mg/dL    Glucose 100 (H) 70 - 99 mg/dL    Alkaline Phosphatase 47 40 - 150 U/L    AST 15 0 - 45 U/L    ALT 19 0 - 50 U/L    Protein Total 8.1 6.8 - 8.8 g/dL    Albumin 4.2 3.4 - 5.0 g/dL    Bilirubin Total 0.4 0.2 - 1.3 mg/dL    GFR Estimate >90 >60 mL/min/1.73m2   Lipase     Status: Normal   Result Value Ref Range    Lipase 103 73 - 393 U/L   Lactic acid whole blood     Status: Normal   Result Value Ref Range    Lactic Acid 1.1 0.7 - 2.0 mmol/L   UA with Microscopic     Status: Abnormal   Result Value Ref Range    Color Urine Light Yellow Colorless, Straw, Light Yellow, Yellow    Appearance Urine Clear Clear    Glucose Urine Negative Negative mg/dL    Bilirubin Urine Negative Negative    Ketones Urine 80  (A) Negative mg/dL    Specific Gravity Urine 1.005 1.003 - 1.035    Blood Urine Small (A) Negative    pH Urine 7.0 5.0 - 7.0    Protein Albumin Urine 10  (A) Negative mg/dL    Urobilinogen Urine Normal Normal, 2.0 mg/dL    Nitrite Urine Negative Negative    Leukocyte Esterase Urine Trace (A) Negative    Mucus Urine Present (A) None Seen /LPF    RBC Urine 3 (H) <=2 /HPF    WBC Urine 11 (H) <=5 /HPF     Squamous Epithelials Urine 3 (H) <=1 /HPF   CBC with platelets and differential     Status: Abnormal   Result Value Ref Range    WBC Count 9.6 4.0 - 11.0 10e3/uL    RBC Count 4.23 3.80 - 5.20 10e6/uL    Hemoglobin 11.4 (L) 11.7 - 15.7 g/dL    Hematocrit 35.5 35.0 - 47.0 %    MCV 84 78 - 100 fL    MCH 27.0 26.5 - 33.0 pg    MCHC 32.1 31.5 - 36.5 g/dL    RDW 14.1 10.0 - 15.0 %    Platelet Count 308 150 - 450 10e3/uL    % Neutrophils 77 %    % Lymphocytes 18 %    % Monocytes 4 %    % Eosinophils 1 %    % Basophils 0 %    % Immature Granulocytes 0 %    NRBCs per 100 WBC 0 <1 /100    Absolute Neutrophils 7.3 1.6 - 8.3 10e3/uL    Absolute Lymphocytes 1.7 0.8 - 5.3 10e3/uL    Absolute Monocytes 0.4 0.0 - 1.3 10e3/uL    Absolute Eosinophils 0.1 0.0 - 0.7 10e3/uL    Absolute Basophils 0.0 0.0 - 0.2 10e3/uL    Absolute Immature Granulocytes 0.0 <=0.4 10e3/uL    Absolute NRBCs 0.0 10e3/uL   Asymptomatic COVID-19 Virus (Coronavirus) by PCR Nose     Status: Normal    Specimen: Nose; Swab   Result Value Ref Range    SARS CoV2 PCR Negative Negative    Narrative    Testing was performed using the stephy  SARS-CoV-2 & Influenza A/B Assay on the stephy  Nanci  System.  This test should be ordered for the detection of SARS-COV-2 in individuals who meet SARS-CoV-2 clinical and/or epidemiological criteria. Test performance is unknown in asymptomatic patients.  This test is for in vitro diagnostic use under the FDA EUA for laboratories certified under CLIA to perform moderate and/or high complexity testing. This test has not been FDA cleared or approved.  A negative test does not rule out the presence of PCR inhibitors in the specimen or target RNA in concentration below the limit of detection for the assay. The possibility of a false negative should be considered if the patient's recent exposure or clinical presentation suggests COVID-19.  [facility] [employer] are certified under the Clinical Laboratory Improvement Amendments of  1988 (CLIA-88) as qualified to perform moderate and/or high complexity laboratory testing.   Creatinine POCT     Status: Normal   Result Value Ref Range    Creatinine POCT 0.5 0.5 - 1.0 mg/dL    GFR, ESTIMATED POCT >60 >60 mL/min/1.73m2   iStat HCG Quantitative Pregnancy, POCT     Status: Normal   Result Value Ref Range    HCG QUANTITATIVE POCT <5.0 0.0 - 5.0 IU/L   Adult Type and Screen     Status: None   Result Value Ref Range    ABO/RH(D) A POS     Antibody Screen Negative Negative    SPECIMEN EXPIRATION DATE [id number]    CBC with platelets differential     Status: Abnormal    Narrative    The following orders were created for panel order CBC with platelets differential.  Procedure                               Abnormality         Status                     ---------                               -----------         ------                     CBC with platelets and d...[[id number]]  Abnormal            Final result                 Please view results for these tests on the individual orders.   ABO/Rh type and screen     Status: None    Narrative    The following orders were created for panel order ABO/Rh type and screen.  Procedure                               Abnormality         Status                     ---------                               -----------         ------                     Adult Type and Screen[[id number]]                            Edited Result - FINAL        Please view results for these tests on the individual orders.        Attestation:  I have reviewed today's vital signs, notes, medications, labs and imaging.  Amount of time performed on this consult: 75 minutes.    Ulysses Petersen MD

## 2022-03-01 NOTE — ED NOTES
MD paged regarding t being in pain but SBPs in 80s. Bolus ordered. No clarification on if to give pain meds or not.

## 2022-03-01 NOTE — ED NOTES
[facility]  ED Nurse Handoff Report    Franci Wu is a 36 year old female   ED Chief complaint: Abdominal Pain and Vomiting  . ED Diagnosis:   Final diagnoses:   Small bowel obstruction (H)   Chronic low blood pressure     Allergies:   Allergies   Allergen Reactions     Minocycline Swelling       Code Status: Full Code  Activity level - Baseline/Home:  Independent. Activity Level - Current:   Stand by Assist. Lift room needed: No. Bariatric: No   Needed: Yes   Isolation: No. Infection: Not Applicable.     Vital Signs:   Vitals:    02/28/22 2230 02/28/22 2245 02/28/22 2330 03/01/22 0000   BP: (!) 88/55 (!) 89/57 (!) 80/56 (!) 85/54   Pulse: 68 70 74 69   Resp:    16   Temp:       TempSrc:       SpO2:    100%       Cardiac Rhythm:  ,      Pain level:    Patient confused: No. Patient Falls Risk: Yes.   Elimination Status: Has voided   Patient Report - Initial Complaint: Abd pain, vomiting. Focused Assessment: Franci Wu is a 36 year old female who presents with her  for evaluation of sudden onset 101/10 upper abdominal pain that started around 1:00-1:30pm today.  Not associated with activity or eating.  Tried omeprazole without relief.  Vomited a few times.  Seemed distended which improved after vomiting.  No diarrhea.  No fever, cough, shortness of breath, chest pain.  Patient is moaning.  Most of history provided by  due to level of patient's pain.  No previous abdominal or pelvic surgeries.  No previous occurrence of symptoms.  No associated urinary symptoms.  Vaginal spotting for 4-5 days and it is not time for her period.  Feeling dizzy/lightheaded.   Tests Performed: CT, labs. Abnormal Results:   Labs Ordered and Resulted from Time of ED Arrival to Time of ED Departure   COMPREHENSIVE METABOLIC PANEL - Abnormal       Result Value    Sodium 137      Potassium 3.6      Chloride 104      Carbon Dioxide (CO2) 27      Anion Gap 6      Urea Nitrogen 11      Creatinine 0.59       Calcium 9.5      Glucose 100 (*)     Alkaline Phosphatase 47      AST 15      ALT 19      Protein Total 8.1      Albumin 4.2      Bilirubin Total 0.4      GFR Estimate >90     ROUTINE UA WITH MICROSCOPIC - Abnormal    Color Urine Light Yellow      Appearance Urine Clear      Glucose Urine Negative      Bilirubin Urine Negative      Ketones Urine 80  (*)     Specific Gravity Urine 1.005      Blood Urine Small (*)     pH Urine 7.0      Protein Albumin Urine 10  (*)     Urobilinogen Urine Normal      Nitrite Urine Negative      Leukocyte Esterase Urine Trace (*)     Mucus Urine Present (*)     RBC Urine 3 (*)     WBC Urine 11 (*)     Squamous Epithelials Urine 3 (*)    CBC WITH PLATELETS AND DIFFERENTIAL - Abnormal    WBC Count 9.6      RBC Count 4.23      Hemoglobin 11.4 (*)     Hematocrit 35.5      MCV 84      MCH 27.0      MCHC 32.1      RDW 14.1      Platelet Count 308      % Neutrophils 77      % Lymphocytes 18      % Monocytes 4      % Eosinophils 1      % Basophils 0      % Immature Granulocytes 0      NRBCs per 100 WBC 0      Absolute Neutrophils 7.3      Absolute Lymphocytes 1.7      Absolute Monocytes 0.4      Absolute Eosinophils 0.1      Absolute Basophils 0.0      Absolute Immature Granulocytes 0.0      Absolute NRBCs 0.0     LIPASE - Normal    Lipase 103     LACTIC ACID WHOLE BLOOD - Normal    Lactic Acid 1.1     COVID-19 VIRUS (CORONAVIRUS) BY PCR - Normal    SARS CoV2 PCR Negative     ISTAT CREATININE POCT - Normal    Creatinine POCT 0.5      GFR, ESTIMATED POCT >60     ISTAT HCG QUANTITATIVE PREGNANCY POCT - Normal    HCG QUANTITATIVE POCT <5.0     TYPE AND SCREEN, ADULT    ABO/RH(D) A POS      Antibody Screen Negative      SPECIMEN EXPIRATION DATE [id number]     ABO/RH TYPE AND SCREEN     .   Treatments provided: IVF, dilaudid, zofran, see MAR  Family Comments:  at bedside speaks english  OBS brochure/video discussed/provided to patient:  No  ED Medications:   Medications   HYDROmorphone  (PF) (DILAUDID) injection 0.5 mg (0.5 mg Intravenous Given 2/28/22 2337)   0.9% sodium chloride BOLUS (0 mLs Intravenous Stopped 2/28/22 1900)   HYDROmorphone (DILAUDID) injection 1 mg (1 mg Intravenous Given 2/28/22 1819)   ondansetron (ZOFRAN) injection 4 mg (4 mg Intravenous Given 2/28/22 1806)   CT Scan Flush (59 mLs Intravenous Given 2/28/22 1836)   iopamidol (ISOVUE-370) solution 500 mL (74 mLs Intravenous Given 2/28/22 1835)   0.9% sodium chloride BOLUS (0 mLs Intravenous Stopped 2/28/22 2113)   sodium chloride 0.9% infusion ( Intravenous Rate/Dose Verify 2/28/22 2259)   ondansetron (ZOFRAN) injection 4 mg (4 mg Intravenous Given 2/28/22 2355)     Drips infusing:  Yes  For the majority of the shift, the patient's behavior Green. Interventions performed were NA.    Sepsis treatment initiated: No     Patient tested for COVID 19 prior to admission: YES    ED Nurse Name/Phone Number: Nelly Wolf RN,   12:23 AM    RECEIVING UNIT ED HANDOFF REVIEW    Above ED Nurse Handoff Report was reviewed: Yes  Reviewed by: Ila Centeno RN on March 1, 2022 at 1:15 PM

## 2022-03-01 NOTE — PROGRESS NOTES
[facility]    Hospitalist Progress Note    Date of Service (when I saw the patient): 03/01/2022  Provider:  Cal Bragg MD   Text Page  7am - 6PM       Assessment & Plan   Franci Wu is a 36 year old female who has minimal PMH to include pregnancy and vaginal spotting.  Reports no issues yesterday and this morning, had lunch.  Starting around 1 pm, acute, severe abdominal pain and N/V.  Initially N/V helped pain, but last few times, was worse.  No melena, BRBPR, no blood in emesis.  Reports dysuria a few days ago, none now.  Does endorse some mild vaginal spotting.  Reports she has never had this issue before.     # Acute abdominal pain  # Suspected SBO  # RLQ wall thickening on CT   Etiology unclear, CT notes findings suggest SBO, with RLQ wall thickening in some small bowel loops, but afebrile, no diarrhea.      - NPO.   - D5/1/2NS/KCL for maintenance.   -Place NGT if omitting and/or progressive distension, Bisacodyl suppository for compression.   -Ambulation schedule.   --Gastrografin enema. GI consulted, thanks  -Ketolorac IV ordered.      # Hypotensive  Initially SBP's were 70's and she endorsed light headedness/dizziness, but improved with IVF's.      -Continue IVF's and boluses.   -Continue to clinically monitor.   -Dilaudid on hold     # Vaginal spotting  Endorses some vaginal spotting, has a history of vaginal spotting.  Reports some dysuria a few days ago, none lately.  UA is unremarkable.   -No changes, continue to clinically monitor.     #Dysuria and chills. Abnormal UA. Rocephin empiric.      DVT Prophylaxis: Pneumatic Compression Devices  Code Status: Full Code    Disposition: Expected discharge TBD.    Interval History   Somnolent, weak and reporting abdominal pain on my visit. Dysuria in the last 2 days with chills.     -Data reviewed today: I reviewed all new labs and imaging results over the last 24 hours.     Physical Exam   Temp: 99.7  F (37.6  C) Temp src: Oral BP:  112/69 Pulse: 74   Resp: 18 SpO2: 100 % O2 Device: None (Room air)    Vitals:    03/01/22 1437   Weight: 55 kg (121 lb 4.8 oz)     Vital Signs with Ranges  Temp:  [97.2  F (36.2  C)-99.7  F (37.6  C)] 99.7  F (37.6  C)  Pulse:  [53-90] 74  Resp:  [12-18] 18  BP: ()/(33-80) 112/69  SpO2:  [99 %-100 %] 100 %  No intake/output data recorded.    GEN:  Alert, oriented x 3, appears comfortable, NAD.  HEENT:  Normocephalic/atraumatic, no scleral icterus, no nasal discharge, mouth moist.  CV:  Regular rate and rhythm, no murmur or JVD.  S1 + S2 noted, no S3 or S4.  LUNGS:  Clear to auscultation bilaterally without rales/rhonchi/wheezing/retractions.  Symmetric chest rise on inhalation noted.  ABD:  Active bowel sounds, soft, diffusely tender to investigate infectious disease consultation patient Dustin/non-distended.  No rebound/guarding/rigidity.  EXT:  No edema or cyanosis.  No joint synovitis noted.  SKIN:  Dry to touch, no exanthems noted in the visualized areas.       Medications     sodium chloride 125 mL/hr at 03/01/22 1037       cefTRIAXone  1 g Intravenous Q24H     famotidine  20 mg Intravenous Q12H     sodium chloride (PF)  3 mL Intracatheter Q8H       Data   Recent Labs   Lab 02/28/22  1807 02/28/22  1803   WBC  --  9.6   HGB  --  11.4*   MCV  --  84   PLT  --  308   NA  --  137   POTASSIUM  --  3.6   CHLORIDE  --  104   CO2  --  27   BUN  --  11   CR 0.5 0.59   ANIONGAP  --  6   ARIADNA  --  9.5   GLC  --  100*   ALBUMIN  --  4.2   PROTTOTAL  --  8.1   BILITOTAL  --  0.4   ALKPHOS  --  47   ALT  --  19   AST  --  15   LIPASE  --  103       Recent Results (from the past 24 hour(s))   POC US ABDOMEN LIMITED    Impression    Limited Bedside Abdominal Ultrasound    Performed by: Dr. Matamoros  Indication: Acute pain; hypotensive  Body area(s) imaged: RUQ, LUQ, pelvic windows  Findings: No free fluid, limited single view of gallbladder without pericholecystic fluid or gallstones, no hydronephrosis  Impression:  Same  Images archived to PACS     CT Abdomen Pelvis w Contrast    Narrative    EXAM: CT ABDOMEN PELVIS W CONTRAST  LOCATION: [facility]  DATE/TIME: 2/28/2022 6:34 PM    INDICATION: Abdominal abscess/infection suspected. Pain.  COMPARISON: None.  TECHNIQUE: CT scan of the abdomen and pelvis was performed following injection of IV contrast. Multiplanar reformats were obtained. Dose reduction techniques were used.  CONTRAST: 74mL Isovue 370    FINDINGS:   LOWER CHEST: Normal.    HEPATOBILIARY: Normal.    PANCREAS: Normal.    SPLEEN: Normal.    ADRENAL GLANDS: Normal.    KIDNEYS/BLADDER: Normal.    BOWEL: There are multiple dilated proximal to mid small bowel loops. Distal small bowel loops are decompressed. A few of the distal decompressed small bowel loops show areas of wall thickening suspected transition to decompression at the right lower   quadrant series 3 image 122. Colon is of normal caliber. Appendix is ill-defined but does not show conspicuous inflammation or enlargement.    LYMPH NODES: Normal.    VASCULATURE: Unremarkable.    PELVIC ORGANS: Small functional cysts at the right ovary. Otherwise unremarkable pelvis. Trace pelvic fluid.    MUSCULOSKELETAL: Normal.      Impression    IMPRESSION:   1.  Finding suggests small bowel obstruction with multiple dilated proximal to mid small bowel loops and decompressed distal small bowel. Transition to decompression at the right lower quadrant noted where there are a few wall thickened small bowel   loops. This could relate to an infectious or inflammatory enteritis versus other etiologies.  2.  Small pelvic fluid.       Disclaimer: This note consists of symbols derived from keyboarding, dictation and/or voice recognition software. As a result, there may be errors in the script that have gone undetected. Please consider this when interpreting information found in this chart.

## 2022-03-01 NOTE — PHARMACY-ADMISSION MEDICATION HISTORY
Admission medication history interview status for this patient is complete. See [facility] admission navigator for allergy information, prior to admission medications and immunization status.     Medication history interview done, indicate source(s): Patient and Family  Medication history resources (including written lists, pill bottles, clinic record):None  Pharmacy: Discharge Pharmacy    Patient is taking neither prescription nor OTC meds.    Prior to Admission medications    Not on File

## 2022-03-01 NOTE — CONSULTS
Gastroenterology Consultation      Franci Wu MRN# [id number]   YOB: 1985 Age: 36 year old   Date of Admission: 2/28/2022     Reason for consult: I was asked by Dr Petersen to evaluate this patient for SBO.               History of Present Illness:   This patient is a 36 year old female who presents with abdominal pain. She had sudden onset of pain Monday afternoon. The pain came in waves every ten minutes, then became more frequent and severe. She made it to the [employer] parking lot where she vomited her lunch and felt better, so went home. After about 40 minutes symptoms became severe and she vomited more, then came to the ER. CTshowed a SBO. She has never had abdominal surgery, bowel movement issues or GI problems. Her father had a SBO in the last few years. No one else she knows is currently sick. She ate socially with different groups of people over the weekend. No fevers. She is better, but still with the pain. Passed a little flatus today.              Past Medical History:     Past Medical History:   Diagnosis Date     Anemia              Past Surgical History:   No past surgical history on file.            Social History:     Social History     Socioeconomic History     Marital status:      Spouse name: Not on file     Number of children: Not on file     Years of education: Not on file     Highest education level: Not on file   Occupational History     Not on file   Tobacco Use     Smoking status: Never Smoker     Smokeless tobacco: Not on file   Substance and Sexual Activity     Alcohol use: No     Drug use: No     Sexual activity: Yes     Partners: Male   Other Topics Concern     Not on file   Social History Narrative     Not on file     Social Determinants of Health     Financial Resource Strain: Not on file   Food Insecurity: Not on file   Transportation Needs: Not on file   Physical Activity: Not on file   Stress: Not on file   Social Connections: Not on file   Intimate Partner  "Violence: Not on file   Housing Stability: Not on file             Family History:   No family history on file.          Allergies:      Allergies   Allergen Reactions     Minocycline Swelling             Medications:     No current outpatient medications on file.             Review of Systems:   10-point review of systems negative except as noted in HPI.            Physical Exam:   Vitals were reviewed  BP 98/62 (BP Location: Right arm)   Pulse 73   Temp 98.7  F (37.1  C) (Oral)   Resp 18   Ht 1.549 m (5' 1\")   Wt 55 kg (121 lb 4.8 oz)   SpO2 100%   BMI 22.92 kg/m    Constitutional:   No distress     Heent:   NC/AT, sclera anicteric     Neck:   No adenopathy, thyroid not palpable   Respiratory:   CTA anteriorly     Cardiovascular:   RRR, no murmur     Gastrointestinal:   High pitched BS, soft, minimal tenderness, some tympany     Extremities:   No clubbing, cyanosis or edema   Neuro:   Grossly nonfocal     Skin:   No rashes or ecchymoses   Psychiatry:        No evidence of anxiety or depression         Data:     ROUTINE IP LABS  BMP  Last Comprehensive Metabolic Panel:  Sodium   Date Value Ref Range Status   02/28/2022 137 133 - 144 mmol/L Final     Potassium   Date Value Ref Range Status   02/28/2022 3.6 3.4 - 5.3 mmol/L Final     Chloride   Date Value Ref Range Status   02/28/2022 104 94 - 109 mmol/L Final     Carbon Dioxide (CO2)   Date Value Ref Range Status   02/28/2022 27 20 - 32 mmol/L Final     Anion Gap   Date Value Ref Range Status   02/28/2022 6 3 - 14 mmol/L Final     Glucose   Date Value Ref Range Status   02/28/2022 100 (H) 70 - 99 mg/dL Final     Urea Nitrogen   Date Value Ref Range Status   02/28/2022 11 7 - 30 mg/dL Final     Creatinine   Date Value Ref Range Status   02/28/2022 0.59 0.52 - 1.04 mg/dL Final     Creatinine POCT   Date Value Ref Range Status   02/28/2022 0.5 0.5 - 1.0 mg/dL Final     GFR Estimate   Date Value Ref Range Status   02/28/2022 >90 >60 mL/min/1.73m2 Final     " Comment:     Effective December 21, 2021 eGFRcr in adults is calculated using the 2021 CKD-EPI creatinine equation which includes age and gender (Adeline et al., NEJ, DOI: 10.1056/OGWXrj0490073)     GFR, ESTIMATED POCT   Date Value Ref Range Status   02/28/2022 >60 >60 mL/min/1.73m2 Final     Calcium   Date Value Ref Range Status   02/28/2022 9.5 8.5 - 10.1 mg/dL Final       CBC  Lab Results   Component Value Date    WBC 9.6 02/28/2022     Lab Results   Component Value Date    RBC 4.23 02/28/2022     Lab Results   Component Value Date    HGB 11.4 02/28/2022    HGB 9.6 07/18/2015     Lab Results   Component Value Date    HCT 35.5 02/28/2022     No components found for: MCT  Lab Results   Component Value Date    MCV 84 02/28/2022     Lab Results   Component Value Date    MCH 27.0 02/28/2022     Lab Results   Component Value Date    MCHC 32.1 02/28/2022     Lab Results   Component Value Date    RDW 14.1 02/28/2022     Lab Results   Component Value Date     02/28/2022       INR  No results found for: INR    PANCREAS  Recent Labs   Lab 02/28/22  1803   LIPASE 103     LFT  Recent Labs   Lab 02/28/22  1803   PROTTOTAL 8.1   ALBUMIN 4.2   BILITOTAL 0.4   ALKPHOS 47   AST 15   ALT 19                Assessment and Plan:   SBO. No obvious reason as no prior surgeries or GI issues. No one else sick, symptoms not suggestive of a gastroenteritis. Agree with supportive care. Gastrografin challenge pending. If symptoms resolve, would evaluate the small intestine to evaluate for an intrinsic lesion, or evidence of IBD.    30 minutes spent    Houston Nunn MD  [facility]  Office:  [phone]

## 2022-03-02 LAB
ALBUMIN SERPL-MCNC: 2.6 G/DL (ref 3.4–5)
ALP SERPL-CCNC: 37 U/L (ref 40–150)
ALT SERPL W P-5'-P-CCNC: 14 U/L (ref 0–50)
ANION GAP SERPL CALCULATED.3IONS-SCNC: 2 MMOL/L (ref 3–14)
AST SERPL W P-5'-P-CCNC: 13 U/L (ref 0–45)
BASOPHILS # BLD AUTO: 0 10E3/UL (ref 0–0.2)
BASOPHILS NFR BLD AUTO: 1 %
BILIRUB SERPL-MCNC: 0.5 MG/DL (ref 0.2–1.3)
BUN SERPL-MCNC: 3 MG/DL (ref 7–30)
CALCIUM SERPL-MCNC: 7.7 MG/DL (ref 8.5–10.1)
CHLORIDE BLD-SCNC: 114 MMOL/L (ref 94–109)
CO2 SERPL-SCNC: 25 MMOL/L (ref 20–32)
CREAT SERPL-MCNC: 0.58 MG/DL (ref 0.52–1.04)
CRP SERPL-MCNC: <2.9 MG/L (ref 0–8)
EOSINOPHIL # BLD AUTO: 0.1 10E3/UL (ref 0–0.7)
EOSINOPHIL NFR BLD AUTO: 3 %
ERYTHROCYTE [DISTWIDTH] IN BLOOD BY AUTOMATED COUNT: 14.2 % (ref 10–15)
GFR SERPL CREATININE-BSD FRML MDRD: >90 ML/MIN/1.73M2
GLUCOSE BLD-MCNC: 106 MG/DL (ref 70–99)
HCT VFR BLD AUTO: 30.1 % (ref 35–47)
HGB BLD-MCNC: 9.6 G/DL (ref 11.7–15.7)
IMM GRANULOCYTES # BLD: 0 10E3/UL
IMM GRANULOCYTES NFR BLD: 0 %
LYMPHOCYTES # BLD AUTO: 1.4 10E3/UL (ref 0.8–5.3)
LYMPHOCYTES NFR BLD AUTO: 40 %
MCH RBC QN AUTO: 27.4 PG (ref 26.5–33)
MCHC RBC AUTO-ENTMCNC: 31.9 G/DL (ref 31.5–36.5)
MCV RBC AUTO: 86 FL (ref 78–100)
MONOCYTES # BLD AUTO: 0.2 10E3/UL (ref 0–1.3)
MONOCYTES NFR BLD AUTO: 6 %
NEUTROPHILS # BLD AUTO: 1.8 10E3/UL (ref 1.6–8.3)
NEUTROPHILS NFR BLD AUTO: 50 %
NRBC # BLD AUTO: 0 10E3/UL
NRBC BLD AUTO-RTO: 0 /100
PLATELET # BLD AUTO: 246 10E3/UL (ref 150–450)
POTASSIUM BLD-SCNC: 3.6 MMOL/L (ref 3.4–5.3)
PROCALCITONIN SERPL-MCNC: <0.05 NG/ML
PROT SERPL-MCNC: 5.7 G/DL (ref 6.8–8.8)
RBC # BLD AUTO: 3.51 10E6/UL (ref 3.8–5.2)
SODIUM SERPL-SCNC: 141 MMOL/L (ref 133–144)
WBC # BLD AUTO: 3.5 10E3/UL (ref 4–11)

## 2022-03-02 PROCEDURE — 80053 COMPREHEN METABOLIC PANEL: CPT | Performed by: NURSE PRACTITIONER

## 2022-03-02 PROCEDURE — 36415 COLL VENOUS BLD VENIPUNCTURE: CPT | Performed by: HOSPITALIST

## 2022-03-02 PROCEDURE — 82040 ASSAY OF SERUM ALBUMIN: CPT | Performed by: NURSE PRACTITIONER

## 2022-03-02 PROCEDURE — 99232 SBSQ HOSP IP/OBS MODERATE 35: CPT | Performed by: INTERNAL MEDICINE

## 2022-03-02 PROCEDURE — 99231 SBSQ HOSP IP/OBS SF/LOW 25: CPT | Performed by: PHYSICIAN ASSISTANT

## 2022-03-02 PROCEDURE — 250N000009 HC RX 250: Performed by: HOSPITALIST

## 2022-03-02 PROCEDURE — 84145 PROCALCITONIN (PCT): CPT | Performed by: HOSPITALIST

## 2022-03-02 PROCEDURE — 250N000011 HC RX IP 250 OP 636: Performed by: HOSPITALIST

## 2022-03-02 PROCEDURE — 258N000003 HC RX IP 258 OP 636: Performed by: HOSPITALIST

## 2022-03-02 PROCEDURE — 86140 C-REACTIVE PROTEIN: CPT | Performed by: HOSPITALIST

## 2022-03-02 PROCEDURE — 85025 COMPLETE CBC W/AUTO DIFF WBC: CPT | Performed by: NURSE PRACTITIONER

## 2022-03-02 PROCEDURE — 120N000001 HC R&B MED SURG/OB

## 2022-03-02 RX ADMIN — KETOROLAC TROMETHAMINE 30 MG: 30 INJECTION, SOLUTION INTRAMUSCULAR at 07:58

## 2022-03-02 RX ADMIN — FAMOTIDINE 20 MG: 10 INJECTION, SOLUTION INTRAVENOUS at 10:45

## 2022-03-02 RX ADMIN — CEFTRIAXONE 1 G: 1 INJECTION, POWDER, FOR SOLUTION INTRAMUSCULAR; INTRAVENOUS at 10:45

## 2022-03-02 RX ADMIN — POTASSIUM CHLORIDE, DEXTROSE MONOHYDRATE AND SODIUM CHLORIDE: 150; 5; 450 INJECTION, SOLUTION INTRAVENOUS at 00:00

## 2022-03-02 RX ADMIN — FAMOTIDINE 20 MG: 10 INJECTION, SOLUTION INTRAVENOUS at 21:38

## 2022-03-02 ASSESSMENT — ACTIVITIES OF DAILY LIVING (ADL)
ADLS_ACUITY_SCORE: 5

## 2022-03-02 NOTE — PLAN OF CARE
Pertinent assessments: A&Ox4. Up SBA. SBs hypotensive. MD aware. Monitoring for now. RA. BS active. Denies pain & nausea. Passing small amounts of gas.     Major Shift Events: xray done for gastrografin challenge. Results pending.    Treatment Plan: Pain management, nausea management, IV fluids, Gastrografin challenge, and NPO.    Bedside Nurse: Johana Alvarez RN

## 2022-03-02 NOTE — PROGRESS NOTES
[facility]  Hospitalist Progress Note  Melany Mcfarland MD 03/02/22    Reason for Stay (Diagnosis): SBO         Assessment and Plan:      Summary of Stay: Franci Wu is a 36 year old female with no significant past medical history who was admitted on 2/28/2022 with acute abdominal pain, nausea and emesis and was found to have SBO.  Patient underwent gastrografin challenge on 3/1, slowly improving.    Problem List/Assessment and Plan:     SBO: Presented with acute onset abdominal pain, nausea and vomiting.  CT showed small bowel obstruction with dilated proximal to mid small bowel loops with a transition to decompression at the right lower quadrant with air a few wall thickened small bowel loops.  Normal lactic acid, lipase and WBC.  She underwent Gastrografin challenge on 3/1 which showed resolution of obstruction, had a small bowel movement this morning.  Still having abdominal pain but overall better than admission.  -GI and surgery consulted, appreciate recommendations  -Clear liquid diet, advance slowly    Anemia: Hemoglobin was 11.4 on admission, has decreased to 9.6 today.  She had some vaginal bleeding but no other blood loss.  All of her cell counts have declined so I suspect that this is at least partially dilutional.  Repeat CBC tomorrow.    Hypotension: Initially systolic blood pressure in the 70s with associated lightheadedness and dizziness.  This is now resolved but systolic blood pressure ranges from the 80s to 90s.  Per chart reviewed she does have soft pressures normally.  She has received IV fluid boluses.  As she is asymptomatic will continue maintenance fluid but no further boluses unless she has a drop in her pressures and is symptomatic.    Possible UTI: UA had 11 WBC with dysuria.  She was started on ceftriaxone.  Urine culture not obtained at this point I think it will be negative given she has been on antibiotics.  Will complete a 3-day course of antibiotics.    Diet: Clear  "Liquid Diet    DVT Prophylaxis: Pneumatic Compression Devices  [place]iff Catheter: Not present  Code Status: Full Code      Disposition Plan   Expected Discharge: 03/03/2022     Anticipated discharge location: As long as diet advanced and tolerates this    Entered: Melany Mcfarland MD 03/02/2022, 10:17 AM       The patient's care was discussed with the Bedside Nurse, Patient and Patient's Family.    Hospitalist Service  [facility]          Interval History (Subjective):      Patient was seen with her  at the bedside.  She did have a small bowel movement this morning.  Continues to have intermittent cramping abdominal pain but overall better than admission.  No nausea or vomiting.  Denies chest pain or shortness of breath.  She did ambulate the halls yesterday and did not have dizziness or lightheadedness.  She has tolerated ice chips so far.  Reviewed the care plan and all of her and her 's questions were answered.                  Physical Exam:      Last Vital Signs:  BP 97/65 (BP Location: Right arm)   Pulse 64   Temp 98.8  F (37.1  C) (Oral)   Resp 16   Ht 1.549 m (5' 1\")   Wt 54.4 kg (119 lb 14.4 oz)   SpO2 100%   BMI 22.65 kg/m      General: Alert, awake, no acute distress.  HEENT: Normocephalic and atraumatic, eyes anicteric and without scleral injection, EOMI, face symmetric, MMM.  Cardiac: RRR, normal S1, S2. No m/g/r, no LE edema.  Pulmonary: Normal chest rise, normal work of breathing.  Lungs CTAB without crackles or wheezing.  Abdomen: soft, diffuse mild tenderness in the lower abdomen, non-distended.  Normoactive bowel sounds, no guarding or rebound tenderness.  Extremities: no deformities.  Warm, well perfused.  Skin: no rashes or lesions.  Warm and Dry.  Neuro: No focal deficits.  Speech clear.  Coordination and strength grossly normal.  Psych: Alert and oriented x3. Appropriate affect.         Medications:      All current medications were reviewed with changes " reflected in problem list.         Data:      All new lab and imaging data was reviewed.   Labs:  Recent Labs   Lab 03/02/22  0635 02/28/22  1807 02/28/22  1803     --  137   POTASSIUM 3.6  --  3.6   CHLORIDE 114*  --  104   CO2 25  --  27   ANIONGAP 2*  --  6   *  --  100*   BUN 3*  --  11   CR 0.58 0.5 0.59   GFRESTIMATED >90 >60 >90   ARIADNA 7.7*  --  9.5     Recent Labs   Lab 03/02/22  0635 02/28/22  1803   WBC 3.5* 9.6   HGB 9.6* 11.4*   HCT 30.1* 35.5   MCV 86 84    308      Imaging:   Recent Results (from the past 24 hour(s))   XR Gastrografin  Challenge    Narrative    GASTROGRAFIN CHALLENGE   3/2/2022 12:13 AM     HISTORY: Small bowel obstruction.    COMPARISON: 2/28/2022.      Impression    IMPRESSION: Previously administered enteric contrast is present  throughout nondilated colon. A normal appendix is opacified. No small  bowel distention or free air.     LILIANA TRENT MD         SYSTEM ID:  [id number]       Melany Mcfarland MD

## 2022-03-02 NOTE — PROGRESS NOTES
Cross cover notified of patient with asymptomatic blood pressure of 86/53.  She has chronic low blood pressure per chart review.  Blood pressure was in the 70s that did respond to IV fluids.  At that time she was endorsing symptoms which have resolved.  Here for SBO.  Hemoglobin was 11.4 so doubt blood loss.  She is already received 4 L of NS in boluses today and has been on continuous fluids.  Lactic acid previously not elevated.  She is developing some edema from the fluids.  -Monitor BP for now for symptoms and continue maintenance fluids.  If blood pressure dropping into the 70 systolic or developing symptoms would provide further IV fluid bolus  and check hemoglobin.

## 2022-03-02 NOTE — PLAN OF CARE
Pertinent assessments: VSS on room air. T max: 99.7. NPO except ice chips. BS normoactive. Last BM on Sunday. Abdomen flat, nondistended. PRN Toradol given x1. PRN Zofran given x1. Up with SBA. Pt ambulated in the halls with her .     Major Shift Events: Drank PO Gastrografin in anticipation of radiology exam at 0030 on 3/2.    Treatment Plan: Pain management, nausea management, IV fluids, Gastrografin challenge, and NPO.

## 2022-03-02 NOTE — PROGRESS NOTES
"[facility]   General Surgery Progress Note 3/2/2022         Assessment and Plan:   Assessment:   Small bowel obstruction with unclear etiology (no previous surgery)  SBFT shows contrast in colon  Passing flatus and had a BM      Plan:   -Diet: clear liquids  -GI following, may evaluate small bowel at some point  -Hospitalist managing medically  -Disposition: No surgical indications at present. Patient still has pain this morning but is overall improving quite a bit. Will slowly advance diet and monitor.         Interval History:   Resting in bed, doing well. She tells me she did have some pain this morning. After she had a BM she felt better but then pain came back. She has hyperactive BS and could be some cramping. Continues to pass flatus. She has been up in room. Voiding independently. NPO. Ordered a clear tray.         Physical Exam:   Blood pressure 97/65, pulse 64, temperature 98.8  F (37.1  C), temperature source Oral, resp. rate 16, height 1.549 m (5' 1\"), weight 54.4 kg (119 lb 14.4 oz), SpO2 100 %, unknown if currently breastfeeding.    I/O last 3 completed shifts:  In: 120 [P.O.:120]  Out: -     General: alert and oriented, no apparent distress  Cardio: RRR  Lungs: CTA bilaterally  Abdomen: soft, +mildly distended, +mild LLQ and RLQ tenderness, +hyperactive BS              Data:     Recent Labs   Lab Test 03/02/22  0635 02/28/22  1803 07/18/15  1110   HGB 9.6* 11.4* 9.6*   WBC 3.5* 9.6  --      SBFT 3/01/22                                                                IMPRESSION: Previously administered enteric contrast is present  throughout nondilated colon. A normal appendix is opacified. No small  bowel distention or free air     Bob Garcia PA-C    Agree with above. Some abdominal \"soreness\" and bloating persists, but she is toelrating full liquids well and has had several loose bowel movements. Agree with advancing diet slowly, with likely advancement to low fiber diet tomorrow if " tolerating well. Patient plans to follow up with GI as an outpatient.     Ann Kaba MD

## 2022-03-02 NOTE — PLAN OF CARE
For vital signs and complete assessments, please see documentation flowsheets.     Pertinent assessments: A&Ox4. Up ind. B/p improved, denies lightheadedness. RA, mild pain this AM with relief from Toradol. BS active. Med loose BM x 1, passing gas. Denies pain & nausea. Family at bedside. Encouraged activity/ambulating.     Major Shift Events: Xray shows improvement, advanced to clear liquids, tolerating well.     Treatment Plan: Pain management, nausea management, IV fluids, ADAT     Bedside Nurse: Lenore Arias RN

## 2022-03-02 NOTE — PROGRESS NOTES
"GASTROENTEROLOGY PROGRESS NOTE        SUBJECTIVE:  Some improvement in abdominal pain overnight.  However pain has returned this morning.  She did pass a small bowel movement this morning.  No fevers and no chills.     OBJECTIVE:    BP 97/65 (BP Location: Right arm)   Pulse 64   Temp 98.8  F (37.1  C) (Oral)   Resp 16   Ht 1.549 m (5' 1\")   Wt 54.4 kg (119 lb 14.4 oz)   SpO2 100%   BMI 22.65 kg/m    Temp (24hrs), Av  F (37.2  C), Min:98.7  F (37.1  C), Max:99.7  F (37.6  C)    Patient Vitals for the past 72 hrs:   Weight   22 0350 54.4 kg (119 lb 14.4 oz)   22 1437 55 kg (121 lb 4.8 oz)       Intake/Output Summary (Last 24 hours) at 3/2/2022 0846  Last data filed at 3/2/2022 0800  Gross per 24 hour   Intake 2043 ml   Output --   Net 2043 ml        PHYSICAL EXAM     Constitutional: NAD    Abdomen: hypoactive BS, diffusely tender throughout         Additional Comments:  ROS, FH, SH: See initial GI consult for details.     I have reviewed the patient's new clinical lab results:     Recent Labs   Lab Test 22  0635 22  1803 07/18/15  1110   WBC 3.5* 9.6  --    HGB 9.6* 11.4* 9.6*   MCV 86 84  --     308  --      Recent Labs   Lab Test 22  0635 22  1803   POTASSIUM 3.6 3.6   CHLORIDE 114* 104   CO2 25 27   BUN 3* 11   ANIONGAP 2* 6     Recent Labs   Lab Test 22  0635 22  2353 22  1803   ALBUMIN 2.6*  --  4.2   BILITOTAL 0.5  --  0.4   ALT 14  --  19   AST 13  --  15   PROTEIN  --  10 *  --    LIPASE  --   --  103     IMAGING    GASTROGRAFIN CHALLENGE   3/2/2022 12:13 AM      HISTORY: Small bowel obstruction.     COMPARISON: 2022.                                                               IMPRESSION: Previously administered enteric contrast is present  throughout nondilated colon. A normal appendix is opacified. No small  bowel distention or free air.     ASSESSMENT/ PLAN  Franci Wu is a 36-year-old female originally from [place] who presented " to the emergency room with severe abdominal pain, nausea and vomiting, found to have a small bowel obstruction.    1.  Small bowel obstruction: Etiology unclear, CT findings suggestive of small bowel obstruction with right lower quadrant small bowel wall thickening.  Patient has no history of any abdominal surgeries.  She denies any chronic abdominal pain, diarrhea, or hematochezia.  -- Gastrografin enema revealing contrast passing into a nondilated colon.  -- Patient did pass a small bowel movement this morning.  -- She is continuing to have abdominal discomfort this morning  -- General surgery is following as no etiology has been found for her SBO.  Possible laparoscopy/laparotomy in the future.    2.  Normocytic anemia: Hemoglobin 9.6, MCV 86, no known history of anemia.  No evidence of overt GI bleeding.    Discussed with Dr. Edouard Stoddard PA-C  [facility] ( [facility])

## 2022-03-03 VITALS
DIASTOLIC BLOOD PRESSURE: 59 MMHG | TEMPERATURE: 98 F | SYSTOLIC BLOOD PRESSURE: 97 MMHG | OXYGEN SATURATION: 99 % | WEIGHT: 119.9 LBS | HEART RATE: 69 BPM | RESPIRATION RATE: 16 BRPM | BODY MASS INDEX: 22.64 KG/M2 | HEIGHT: 61 IN

## 2022-03-03 LAB
ANION GAP SERPL CALCULATED.3IONS-SCNC: 3 MMOL/L (ref 3–14)
BUN SERPL-MCNC: 5 MG/DL (ref 7–30)
CALCIUM SERPL-MCNC: 8.7 MG/DL (ref 8.5–10.1)
CHLORIDE BLD-SCNC: 111 MMOL/L (ref 94–109)
CO2 SERPL-SCNC: 25 MMOL/L (ref 20–32)
CREAT SERPL-MCNC: 0.61 MG/DL (ref 0.52–1.04)
ERYTHROCYTE [DISTWIDTH] IN BLOOD BY AUTOMATED COUNT: 13.7 % (ref 10–15)
GFR SERPL CREATININE-BSD FRML MDRD: >90 ML/MIN/1.73M2
GLUCOSE BLD-MCNC: 94 MG/DL (ref 70–99)
HCT VFR BLD AUTO: 31.7 % (ref 35–47)
HGB BLD-MCNC: 10.1 G/DL (ref 11.7–15.7)
MCH RBC QN AUTO: 26.9 PG (ref 26.5–33)
MCHC RBC AUTO-ENTMCNC: 31.9 G/DL (ref 31.5–36.5)
MCV RBC AUTO: 85 FL (ref 78–100)
PLATELET # BLD AUTO: 268 10E3/UL (ref 150–450)
POTASSIUM BLD-SCNC: 3.6 MMOL/L (ref 3.4–5.3)
RBC # BLD AUTO: 3.75 10E6/UL (ref 3.8–5.2)
SODIUM SERPL-SCNC: 139 MMOL/L (ref 133–144)
WBC # BLD AUTO: 5.2 10E3/UL (ref 4–11)

## 2022-03-03 PROCEDURE — 85027 COMPLETE CBC AUTOMATED: CPT | Performed by: INTERNAL MEDICINE

## 2022-03-03 PROCEDURE — 99239 HOSP IP/OBS DSCHRG MGMT >30: CPT | Performed by: INTERNAL MEDICINE

## 2022-03-03 PROCEDURE — 99231 SBSQ HOSP IP/OBS SF/LOW 25: CPT | Performed by: PHYSICIAN ASSISTANT

## 2022-03-03 PROCEDURE — 82310 ASSAY OF CALCIUM: CPT | Performed by: INTERNAL MEDICINE

## 2022-03-03 PROCEDURE — 36415 COLL VENOUS BLD VENIPUNCTURE: CPT | Performed by: INTERNAL MEDICINE

## 2022-03-03 ASSESSMENT — ACTIVITIES OF DAILY LIVING (ADL)
ADLS_ACUITY_SCORE: 5

## 2022-03-03 NOTE — PROGRESS NOTES
"[facility]   General Surgery Progress Note 3/2/2022         Assessment and Plan:   Assessment:   Small bowel obstruction with unclear etiology (no previous surgery) - resolved  SBFT shows contrast in colon  Passing flatus and had a BM      Plan:   -Diet: low fiber  -Hospitalist managing medically  -Disposition: ok to discharge home from surgical perspective, no surgical follow up needed         Interval History:   Sitting up in bed.  Tolerating diet with no nausea.  Has occasional crampy pain that is tolerable and much improved.  Passing gas and having bowel movement.          Physical Exam:   Blood pressure 97/59, pulse 69, temperature 98  F (36.7  C), temperature source Oral, resp. rate 16, height 1.549 m (5' 1\"), weight 54.4 kg (119 lb 14.4 oz), SpO2 99 %, unknown if currently breastfeeding.    I/O last 3 completed shifts:  In: 3404 [P.O.:970; I.V.:2434]  Out: -     General: alert and oriented, no apparent distress            Data:     Recent Labs   Lab Test 03/02/22  0635 02/28/22  1803 07/18/15  1110   HGB 9.6* 11.4* 9.6*   WBC 3.5* 9.6  --      SBFT 3/01/22                                                                IMPRESSION: Previously administered enteric contrast is present  throughout nondilated colon. A normal appendix is opacified. No small  bowel distention or free air     Shayleeaye German PA-C    "

## 2022-03-03 NOTE — PROGRESS NOTES
"GASTROENTEROLOGY PROGRESS NOTE        SUBJECTIVE:  Pain improving. Tolerated low fiber diet without nausea, vomiting, or significant increase in pain. Passing flatus. Had nonbloody, loose BM yesterday. Denies NSAID use prior to admission.      OBJECTIVE:    BP 97/59   Pulse 69   Temp 98  F (36.7  C) (Oral)   Resp 16   Ht 1.549 m (5' 1\")   Wt 54.4 kg (119 lb 14.4 oz)   SpO2 99%   BMI 22.65 kg/m    Temp (24hrs), Av  F (37.2  C), Min:98.7  F (37.1  C), Max:99.7  F (37.6  C)    Patient Vitals for the past 72 hrs:   Weight   22 0350 54.4 kg (119 lb 14.4 oz)   22 1437 55 kg (121 lb 4.8 oz)       Intake/Output Summary (Last 24 hours) at 3/2/2022 0846  Last data filed at 3/2/2022 0800  Gross per 24 hour   Intake 2043 ml   Output --   Net 2043 ml        PHYSICAL EXAM     Constitutional: NAD    Abdomen: +BS, minimal distension, mildly tender lower abd diffusely. No rebound or guarding.       Additional Comments:  ROS, FH, SH: See initial GI consult for details.     I have reviewed the patient's new clinical lab results:     Recent Labs   Lab Test 22  0601 22  0635 22  1803   WBC 5.2 3.5* 9.6   HGB 10.1* 9.6* 11.4*   MCV 85 86 84    246 308     Recent Labs   Lab Test 22  0601 22  0635 22  1803   POTASSIUM 3.6 3.6 3.6   CHLORIDE 111* 114* 104   CO2 25 25 27   BUN 5* 3* 11   ANIONGAP 3 2* 6     Recent Labs   Lab Test 22  0635 22  2353 22  1803   ALBUMIN 2.6*  --  4.2   BILITOTAL 0.5  --  0.4   ALT 14  --  19   AST 13  --  15   PROTEIN  --  10 *  --    LIPASE  --   --  103     IMAGING    GASTROGRAFIN CHALLENGE   3/2/2022 12:13 AM      HISTORY: Small bowel obstruction.     COMPARISON: 2022.                                                               IMPRESSION: Previously administered enteric contrast is present  throughout nondilated colon. A normal appendix is opacified. No small  bowel distention or free air.     ASSESSMENT/ PLAN  Franci " Jazmin is a 36-year-old female originally from [facility] who presented to the emergency room with severe abdominal pain, nausea and vomiting, found to have a small bowel obstruction.    1.  Small bowel obstruction: Etiology unclear, CT findings suggestive of small bowel obstruction with right lower quadrant small bowel wall thickening.  Patient has no history of any abdominal surgeries.  She denies any chronic abdominal pain, diarrhea, or hematochezia. Infection vs possible Crohn's.     -- Gastrografin enema revealing contrast passing into a nondilated colon.  -- She is stooling and abd discomfort overall improving along with tolerating low fiber diet.  -- General surgery is following as no etiology has been found for her SBO but given improvements, no surgery planned.   --Will arrange outpatient colonoscopy in 4-6 weeks to allow obstruction/inflammation to resolve.  --Low fiber diet until colonoscopy.  --Follow up after colonoscopy in IBD clinic will also be arranged.   --Advised avoidance of NSAIDs.   --Ok to discharge from GI perspective today.    2.  Normocytic anemia: Hemoglobin 9.6, MCV 86, no known history of anemia.  No evidence of overt GI bleeding.    Will update Dr. Nunn.     Shadia Ramos, PAC  [facility] ( [facility])

## 2022-03-03 NOTE — PLAN OF CARE
AAO4, denies pain. No N/V. Able to ambulate independently. IV removed, slightly red, painful, swollen, ice pack applied and feels better. F/up colonoscopy in 4-6 weeks. No discharge meds ordered. Tylenol recommended for pain

## 2022-03-03 NOTE — DISCHARGE SUMMARY
[facility]  Discharge Summary  Name: Franci Wu    MRN: [id number]  YOB: 1985    Age: 36 year old  Date of Discharge:  3/3/2022  Date of Admission: 2/28/2022  Primary Care Provider: Alessandra Verde  Discharge Physician:  Melany Mcfarland MD  Discharging Service:  Hospitalist      Discharge Diagnoses:  1.  Small bowel obstruction  2.  Anemia   3.  Hypotension  4.  Suspected UTI     Follow-ups Needed After Discharge   Follow up with PCP within 2 weeks    Unresulted Labs Ordered in the Past 30 Days of this Admission     No orders found from 10/16/2018 to 12/16/2018.        Hospital Course:  Franci Wu is a 36 year old female with no significant past medical history who was admitted on 2/28/2022 with acute abdominal pain, nausea and emesis and was found to have SBO.  Patient underwent gastrografin challenge on 3/1, which showed resolution of her SBO.  Her diet was slowly advanced and she tolerated a low fiber diet on the day of discharge.      SBO: Presented with acute onset abdominal pain, nausea and vomiting.  CT showed small bowel obstruction with dilated proximal to mid small bowel loops with a transition to decompression at the right lower quadrant with air a few wall thickened small bowel loops.  Normal lactic acid, lipase and WBC.  She underwent Gastrografin challenge on 3/1 which showed resolution of obstruction, subsequently had loose bowel movements.  GI and surgery were consulted this admission.  Her diet was slowly advanced and by the day of discharge she tolerated a low fiber diet.  Will remain on this diet for 2 weeks then can go back to regular diet.  No need for GI or surgery follow-up upon discharge.  Patient should follow-up with her primary care doctor.     Anemia: Hemoglobin was 11.4 on admission, decreased to 9.6 but on the day of discharge was stable at 10.1.  She had some vaginal bleeding but no other blood loss.  All of her cell counts have declined so I suspect that  "this is at least partially dilutional.       Hypotension: Initially systolic blood pressure in the 70s with associated lightheadedness and dizziness.  This is now resolved but systolic blood pressure ranges from the 80s to 90s.  Per chart reviewed she does have soft pressures normally.    Blood pressure has been stable and she has been without dizziness or lightheadedness.     Suspected UTI: UA had 11 WBC with dysuria.  She was started on ceftriaxone.  Urine culture not obtained at this point I think it will be negative given she has been on antibiotics.  Complete a 3-day course of antibiotics.     Discharge Disposition:  Discharged to home     Allergies:  Allergies   Allergen Reactions     Minocycline Swelling        Condition on Discharge:  Discharge condition: Stable   Discharge vitals: Blood pressure 97/59, pulse 69, temperature 98  F (36.7  C), temperature source Oral, resp. rate 16, height 1.549 m (5' 1\"), weight 54.4 kg (119 lb 14.4 oz), SpO2 99 %, unknown if currently breastfeeding.   Code status on discharge: Full Code     History of Illness:  See detailed admission note for full details.    Physical Exam:  Blood pressure 97/59, pulse 69, temperature 98  F (36.7  C), temperature source Oral, resp. rate 16, height 1.549 m (5' 1\"), weight 54.4 kg (119 lb 14.4 oz), SpO2 99 %, unknown if currently breastfeeding.  Wt Readings from Last 1 Encounters:   03/02/22 54.4 kg (119 lb 14.4 oz)     General: Alert, awake, no acute distress.  HEENT: Normocephalic, atraumatic, eyes anicteric and without scleral injection, EOMI, MMM.  Cardiac: RRR, normal S1, S2.  No m/g/r. No LE edema.  Pulmonary: Normal chest rise, normal work of breathing.  Lungs CTAB  Abdomen: soft, very mild tenderness (much improved from admission), non-distended.  Normoactive BS.  No guarding or rebound tenderness.  Extremities: no deformities.  Warm, well perfused.  Skin: no rashes or lesions noted.  Warm and Dry.  Neuro: No focal deficits noted.  " Speech clear.  Coordination and strength grossly normal.  Psych: Appropriate affect. Alert and oriented x3    Procedures other than Imaging:  Phlebotomy     Imaging:  Results for orders placed or performed during the hospital encounter of 02/28/22   POC US ABDOMEN LIMITED    Impression    Limited Bedside Abdominal Ultrasound    Performed by: Dr. Matamoros  Indication: Acute pain; hypotensive  Body area(s) imaged: RUQ, LUQ, pelvic windows  Findings: No free fluid, limited single view of gallbladder without pericholecystic fluid or gallstones, no hydronephrosis  Impression: Same  Images archived to PACS     CT Abdomen Pelvis w Contrast    Narrative    EXAM: CT ABDOMEN PELVIS W CONTRAST  LOCATION: [facility]  DATE/TIME: 2/28/2022 6:34 PM    INDICATION: Abdominal abscess/infection suspected. Pain.  COMPARISON: None.  TECHNIQUE: CT scan of the abdomen and pelvis was performed following injection of IV contrast. Multiplanar reformats were obtained. Dose reduction techniques were used.  CONTRAST: 74mL Isovue 370    FINDINGS:   LOWER CHEST: Normal.    HEPATOBILIARY: Normal.    PANCREAS: Normal.    SPLEEN: Normal.    ADRENAL GLANDS: Normal.    KIDNEYS/BLADDER: Normal.    BOWEL: There are multiple dilated proximal to mid small bowel loops. Distal small bowel loops are decompressed. A few of the distal decompressed small bowel loops show areas of wall thickening suspected transition to decompression at the right lower   quadrant series 3 image 122. Colon is of normal caliber. Appendix is ill-defined but does not show conspicuous inflammation or enlargement.    LYMPH NODES: Normal.    VASCULATURE: Unremarkable.    PELVIC ORGANS: Small functional cysts at the right ovary. Otherwise unremarkable pelvis. Trace pelvic fluid.    MUSCULOSKELETAL: Normal.      Impression    IMPRESSION:   1.  Finding suggests small bowel obstruction with multiple dilated proximal to mid small bowel loops and decompressed distal small  bowel. Transition to decompression at the right lower quadrant noted where there are a few wall thickened small bowel   loops. This could relate to an infectious or inflammatory enteritis versus other etiologies.  2.  Small pelvic fluid.   XR Gastrografin  Challenge    Narrative    GASTROGRAFIN CHALLENGE   3/2/2022 12:13 AM     HISTORY: Small bowel obstruction.    COMPARISON: 2/28/2022.      Impression    IMPRESSION: Previously administered enteric contrast is present  throughout nondilated colon. A normal appendix is opacified. No small  bowel distention or free air.     LILIANA TRENT MD         SYSTEM ID:  [id number]        Consultations:  Consultations This Hospital Stay   SURGERY GENERAL IP CONSULT  GASTROENTEROLOGY IP CONSULT     Recent Lab Results:  Recent Labs   Lab 03/03/22  0601 03/02/22  0635 02/28/22  1803   WBC 5.2 3.5* 9.6   HGB 10.1* 9.6* 11.4*   HCT 31.7* 30.1* 35.5   MCV 85 86 84    246 308     Recent Labs   Lab 03/03/22  0601 03/02/22  0635 02/28/22  1807 02/28/22  1803    141  --  137   POTASSIUM 3.6 3.6  --  3.6   CHLORIDE 111* 114*  --  104   CO2 25 25  --  27   ANIONGAP 3 2*  --  6   GLC 94 106*  --  100*   BUN 5* 3*  --  11   CR 0.61 0.58 0.5 0.59   GFRESTIMATED >90 >90 >60 >90   ARIADNA 8.7 7.7*  --  9.5          Pending Results:    Unresulted Labs Ordered in the Past 30 Days of this Admission     No orders found from 1/29/2022 to 3/1/2022.           Discharge Instructions and Follow-Up:   Discharge Orders      Reason for your hospital stay    You were hospitalized for abdominal pain and were found to have a small bowel obstruction. This resolved with conservative management.     Follow-up and recommended labs and tests     Follow up with primary care provider, Alessandra Verde, within 7 days for hospital follow- up.  No follow up labs or test are needed.     Activity    Your activity upon discharge: activity as tolerated     Diet    Follow this diet upon discharge: Orders Placed  This Encounter      Low Fiber diet x2 weeks then back to a regular diet     Discharge Medications   Current Discharge Medication List      CONTINUE these medications which have NOT CHANGED    Details   Multiple Vitamin (MULTIVITAMIN ADULT PO) Take 1 tablet by mouth daily             Time Spent on this Encounter   I, Melany Mcfarland MD, personally saw the patient today and spent greater than 30 minutes discharging this patient.    Melany Mcfarland MD

## 2022-03-03 NOTE — PLAN OF CARE
Pertinent assessments: A&Ox4. Up SBA. BPs soft. RA. MD. BS hyperactive. Denies pain & nausea. Passing gas & stool. Tolerating full liquid diet.     Major Shift Events: Uneventful    Treatment Plan: Advance diet, pain and nausea management, and probable discharge on 3/3.    Bedside Nurse: Johana Alvarez RN

## 2022-03-03 NOTE — PLAN OF CARE
Pertinent assessments: VSS on room air. Afebrile. A&Ox4. Tolerating full liquids. Passing flatus, loose BMs today, and BS are normoactive. Denies pain nor nausea.    Major Shift Events: Tolerating full liquids. IV fluids discontinued. No PRN meds given.    Treatment Plan: Advance diet, pain and nausea management, and probable discharge on 3/3.

## 2022-03-17 ENCOUNTER — APPOINTMENT (OUTPATIENT)
Dept: CT IMAGING | Facility: CLINIC | Age: 37
End: 2022-03-17
Attending: EMERGENCY MEDICINE
Payer: COMMERCIAL

## 2022-03-17 ENCOUNTER — HOSPITAL ENCOUNTER (EMERGENCY)
Facility: CLINIC | Age: 37
Discharge: HOME | End: 2022-03-17
Attending: EMERGENCY MEDICINE | Admitting: EMERGENCY MEDICINE
Payer: COMMERCIAL

## 2022-03-17 VITALS
HEART RATE: 74 BPM | RESPIRATION RATE: 18 BRPM | OXYGEN SATURATION: 100 % | TEMPERATURE: 98.2 F | DIASTOLIC BLOOD PRESSURE: 59 MMHG | SYSTOLIC BLOOD PRESSURE: 92 MMHG

## 2022-03-17 DIAGNOSIS — R10.31 RLQ ABDOMINAL PAIN: ICD-10-CM

## 2022-03-17 DIAGNOSIS — N39.0 ACUTE UTI: ICD-10-CM

## 2022-03-17 LAB
ALBUMIN UR-MCNC: NEGATIVE MG/DL
ANION GAP SERPL CALCULATED.3IONS-SCNC: 3 MMOL/L (ref 3–14)
APPEARANCE UR: CLEAR
B-HCG SERPL-ACNC: <1 IU/L (ref 0–5)
BACTERIA #/AREA URNS HPF: ABNORMAL /HPF
BASOPHILS # BLD AUTO: 0 10E3/UL (ref 0–0.2)
BASOPHILS NFR BLD AUTO: 0 %
BILIRUB UR QL STRIP: NEGATIVE
BUN SERPL-MCNC: 6 MG/DL (ref 7–30)
CALCIUM SERPL-MCNC: 9.4 MG/DL (ref 8.5–10.1)
CHLORIDE BLD-SCNC: 104 MMOL/L (ref 94–109)
CO2 SERPL-SCNC: 28 MMOL/L (ref 20–32)
COLOR UR AUTO: ABNORMAL
CREAT SERPL-MCNC: 0.55 MG/DL (ref 0.52–1.04)
EOSINOPHIL # BLD AUTO: 0.1 10E3/UL (ref 0–0.7)
EOSINOPHIL NFR BLD AUTO: 1 %
ERYTHROCYTE [DISTWIDTH] IN BLOOD BY AUTOMATED COUNT: 13.6 % (ref 10–15)
GFR SERPL CREATININE-BSD FRML MDRD: >90 ML/MIN/1.73M2
GLUCOSE BLD-MCNC: 92 MG/DL (ref 70–99)
GLUCOSE UR STRIP-MCNC: NEGATIVE MG/DL
HCT VFR BLD AUTO: 34.6 % (ref 35–47)
HGB BLD-MCNC: 10.9 G/DL (ref 11.7–15.7)
HGB UR QL STRIP: ABNORMAL
HOLD SPECIMEN: NORMAL
IMM GRANULOCYTES # BLD: 0 10E3/UL
IMM GRANULOCYTES NFR BLD: 0 %
KETONES UR STRIP-MCNC: NEGATIVE MG/DL
LEUKOCYTE ESTERASE UR QL STRIP: ABNORMAL
LYMPHOCYTES # BLD AUTO: 1.8 10E3/UL (ref 0.8–5.3)
LYMPHOCYTES NFR BLD AUTO: 15 %
MCH RBC QN AUTO: 26.6 PG (ref 26.5–33)
MCHC RBC AUTO-ENTMCNC: 31.5 G/DL (ref 31.5–36.5)
MCV RBC AUTO: 84 FL (ref 78–100)
MONOCYTES # BLD AUTO: 0.5 10E3/UL (ref 0–1.3)
MONOCYTES NFR BLD AUTO: 4 %
NEUTROPHILS # BLD AUTO: 9.4 10E3/UL (ref 1.6–8.3)
NEUTROPHILS NFR BLD AUTO: 80 %
NITRATE UR QL: NEGATIVE
NRBC # BLD AUTO: 0 10E3/UL
NRBC BLD AUTO-RTO: 0 /100
PH UR STRIP: 5.5 [PH] (ref 5–7)
PLATELET # BLD AUTO: 349 10E3/UL (ref 150–450)
POTASSIUM BLD-SCNC: 4 MMOL/L (ref 3.4–5.3)
RBC # BLD AUTO: 4.1 10E6/UL (ref 3.8–5.2)
RBC URINE: 1 /HPF
SODIUM SERPL-SCNC: 135 MMOL/L (ref 133–144)
SP GR UR STRIP: 1 (ref 1–1.03)
SQUAMOUS EPITHELIAL: <1 /HPF
UROBILINOGEN UR STRIP-MCNC: NORMAL MG/DL
WBC # BLD AUTO: 11.7 10E3/UL (ref 4–11)
WBC URINE: 13 /HPF

## 2022-03-17 PROCEDURE — 99285 EMERGENCY DEPT VISIT HI MDM: CPT | Mod: 25

## 2022-03-17 PROCEDURE — 96375 TX/PRO/DX INJ NEW DRUG ADDON: CPT

## 2022-03-17 PROCEDURE — 36415 COLL VENOUS BLD VENIPUNCTURE: CPT | Performed by: EMERGENCY MEDICINE

## 2022-03-17 PROCEDURE — 96374 THER/PROPH/DIAG INJ IV PUSH: CPT | Mod: 59

## 2022-03-17 PROCEDURE — 82310 ASSAY OF CALCIUM: CPT | Performed by: EMERGENCY MEDICINE

## 2022-03-17 PROCEDURE — 250N000011 HC RX IP 250 OP 636: Performed by: EMERGENCY MEDICINE

## 2022-03-17 PROCEDURE — 74177 CT ABD & PELVIS W/CONTRAST: CPT

## 2022-03-17 PROCEDURE — 258N000003 HC RX IP 258 OP 636: Performed by: EMERGENCY MEDICINE

## 2022-03-17 PROCEDURE — 96361 HYDRATE IV INFUSION ADD-ON: CPT

## 2022-03-17 PROCEDURE — 81001 URINALYSIS AUTO W/SCOPE: CPT | Performed by: EMERGENCY MEDICINE

## 2022-03-17 PROCEDURE — 84702 CHORIONIC GONADOTROPIN TEST: CPT | Performed by: EMERGENCY MEDICINE

## 2022-03-17 PROCEDURE — 250N000013 HC RX MED GY IP 250 OP 250 PS 637: Performed by: EMERGENCY MEDICINE

## 2022-03-17 PROCEDURE — 85004 AUTOMATED DIFF WBC COUNT: CPT | Performed by: EMERGENCY MEDICINE

## 2022-03-17 RX ORDER — MORPHINE SULFATE 4 MG/ML
4 INJECTION, SOLUTION INTRAMUSCULAR; INTRAVENOUS
Status: DISCONTINUED | OUTPATIENT
Start: 2022-03-17 | End: 2022-03-17 | Stop reason: HOSPADM

## 2022-03-17 RX ORDER — ONDANSETRON 2 MG/ML
4 INJECTION INTRAMUSCULAR; INTRAVENOUS EVERY 30 MIN PRN
Status: DISCONTINUED | OUTPATIENT
Start: 2022-03-17 | End: 2022-03-17 | Stop reason: HOSPADM

## 2022-03-17 RX ORDER — CEPHALEXIN 500 MG/1
500 CAPSULE ORAL ONCE
Status: COMPLETED | OUTPATIENT
Start: 2022-03-17 | End: 2022-03-17

## 2022-03-17 RX ORDER — IOPAMIDOL 755 MG/ML
500 INJECTION, SOLUTION INTRAVASCULAR ONCE
Status: COMPLETED | OUTPATIENT
Start: 2022-03-17 | End: 2022-03-17

## 2022-03-17 RX ORDER — LIDOCAINE 40 MG/G
CREAM TOPICAL
Status: DISCONTINUED | OUTPATIENT
Start: 2022-03-17 | End: 2022-03-17 | Stop reason: HOSPADM

## 2022-03-17 RX ORDER — CEPHALEXIN 500 MG/1
500 CAPSULE ORAL 2 TIMES DAILY
Qty: 14 CAPSULE | Refills: 0 | Status: SHIPPED | OUTPATIENT
Start: 2022-03-17 | End: 2022-03-24

## 2022-03-17 RX ADMIN — MORPHINE SULFATE 4 MG: 4 INJECTION INTRAVENOUS at 05:04

## 2022-03-17 RX ADMIN — IOPAMIDOL 60 ML: 755 INJECTION, SOLUTION INTRAVENOUS at 06:01

## 2022-03-17 RX ADMIN — SODIUM CHLORIDE 1000 ML: 9 INJECTION, SOLUTION INTRAVENOUS at 05:04

## 2022-03-17 RX ADMIN — ONDANSETRON 4 MG: 2 INJECTION INTRAMUSCULAR; INTRAVENOUS at 05:04

## 2022-03-17 RX ADMIN — SODIUM CHLORIDE 55 ML: 9 INJECTION, SOLUTION INTRAVENOUS at 06:01

## 2022-03-17 RX ADMIN — CEPHALEXIN 500 MG: 500 CAPSULE ORAL at 06:38

## 2022-03-17 ASSESSMENT — ENCOUNTER SYMPTOMS
VOMITING: 0
CHILLS: 1
DIZZINESS: 1
NAUSEA: 1
DYSURIA: 1
SHORTNESS OF BREATH: 0
ABDOMINAL PAIN: 1
CONSTIPATION: 0
FEVER: 0
BACK PAIN: 1

## 2022-03-17 NOTE — ED PROVIDER NOTES
History   Chief Complaint:  Abdominal Pain       The history is provided by the patient.      Franci Wu is a 36 year old female with history of a small bowel obstruction who presents with abdominal pain. The patient reports that she was diagnosed with a bowel obstruction on 2/28/2022 for which she was admitted to the hospital and eventually able to recover from without surgery. She states that she was seen by her primary care doctor one week ago who noticed tenderness in the lower right quadrant of her abdomen. She reports that she has been doing colonoscopy preparation as she is scheduled to have one performed today at 1300. Last night she states that right before midnight she became very dizzy and developed pain in her lower right abdomen that worsens with movement. Her  also mentions that she has experienced dysuria associated with lower back pain for the the past couple of days. The patient further acknowledges having nausea and chills, but denies any vomiting, fever, chest pain or shortness of breath. She states that she has been having bowel movements with the colonoscopy preparation. She denies any possibility of pregnancy and states she is having regular menstrual cycles.     Review of Systems   Constitutional: Positive for chills. Negative for fever.   Respiratory: Negative for shortness of breath.    Cardiovascular: Negative for chest pain.   Gastrointestinal: Positive for abdominal pain and nausea. Negative for constipation and vomiting.   Genitourinary: Positive for dysuria.   Musculoskeletal: Positive for back pain.   Neurological: Positive for dizziness.   All other systems reviewed and are negative.      Allergies:  Minocycline    Medications:  The patient is currently on no regular medications.    Past Medical History:     Anemia  SBO  Chronic low blood pressure  Acne  Atypical squamous cells of undetermined significance (ASCUS) on Papanicolaou smear of cervix  Latent  tuberculosis  Irregular periods  Nonallopathic lesion of sacral region  GBS (group B streptococcus) UTI complicating pregnancy  Spasm of muscle    Family History:    Father: diabetes  Mother: hypertension    Social History:  Presents with   PCP: Alessandra Verde    Physical Exam     Patient Vitals for the past 24 hrs:   BP Temp Temp src Pulse Resp SpO2   03/17/22 0418 92/55 98.1  F (36.7  C) Temporal 80 18 100 %       Physical Exam  General: Adult female, uncomfortable appearing, laying on stretcher  Eyes: PERRL, Conjunctive within normal limits.  No scleral icterus  ENT: Moist mucous membranes, oropharynx clear.   CV: Normal S1S2, no murmur, rub or gallop. Regular rate and rhythm  Resp: Clear to auscultation bilaterally, no wheezes, rales or rhonchi. Normal respiratory effort.  GI: Abdomen is soft nondistended.  Right lower quadrant and suprapubic tenderness to palpation.  No palpable masses. No rebound or guarding.  MSK: No edema. Nontender. Normal active range of motion.  Skin: Warm and dry. No rashes or lesions or ecchymoses on visible skin.  Neuro: Alert and oriented. Responds appropriately to all questions and commands. No focal findings appreciated. Normal muscle tone.  Psych: Normal mood and affect.     Emergency Department Course     Imaging:  CT Abdomen Pelvis w Contrast   Final Result   IMPRESSION:    1.  Distended urinary bladder with urothelial thickening and hyperenhancement in the right ureter which could reflect sequela of ascending urinary tract infection. Recommend clinical correlation.      2.  Fluid content throughout the large and small bowel. No evidence of mechanical obstruction, appendicitis, or free air.        Report per radiology    Laboratory:  Labs Ordered and Resulted from Time of ED Arrival to Time of ED Departure   URINE MACROSCOPIC WITH REFLEX TO MICRO - Abnormal       Result Value    Color Urine Straw      Appearance Urine Clear      Glucose Urine Negative      Bilirubin  Urine Negative      Ketones Urine Negative      Specific Gravity Urine 1.004      Blood Urine Moderate (*)     pH Urine 5.5      Protein Albumin Urine Negative      Urobilinogen Urine Normal      Nitrite Urine Negative      Leukocyte Esterase Urine Moderate (*)     Bacteria Urine Few (*)     RBC Urine 1      WBC Urine 13 (*)     Squamous Epithelials Urine <1     BASIC METABOLIC PANEL - Abnormal    Sodium 135      Potassium 4.0      Chloride 104      Carbon Dioxide (CO2) 28      Anion Gap 3      Urea Nitrogen 6 (*)     Creatinine 0.55      Calcium 9.4      Glucose 92      GFR Estimate >90     CBC WITH PLATELETS AND DIFFERENTIAL - Abnormal    WBC Count 11.7 (*)     RBC Count 4.10      Hemoglobin 10.9 (*)     Hematocrit 34.6 (*)     MCV 84      MCH 26.6      MCHC 31.5      RDW 13.6      Platelet Count 349      % Neutrophils 80      % Lymphocytes 15      % Monocytes 4      % Eosinophils 1      % Basophils 0      % Immature Granulocytes 0      NRBCs per 100 WBC 0      Absolute Neutrophils 9.4 (*)     Absolute Lymphocytes 1.8      Absolute Monocytes 0.5      Absolute Eosinophils 0.1      Absolute Basophils 0.0      Absolute Immature Granulocytes 0.0      Absolute NRBCs 0.0     HCG QUANTITATIVE PREGNANCY - Normal    hCG Quantitative <1        Emergency Department Course:    Reviewed:  I reviewed nursing notes, vitals, past medical history and Care Everywhere    Assessments:  0451 I obtained history and examined the patient as noted above.   I rechecked the patient and explained findings.       Interventions:  0504 NS 1L IV  0504 Morphine 4 mg IV  0504 Zofran 4 mg IV    Disposition:  The patient was discharged to home.     Impression & Plan       Medical Decision Making:  Franci Wu is a 36 year old female who presents for evaluation of lower abdominal pain and dysuria.  On thorough evaluation and clinical examination, with CT that is supportive, this clinically is consistent with a urinary tract infection.  Urinalysis  confirms the infection.  There has been no fever, back/flank pain or significant abdominal pain.  There is no clinical evidence of pyelonephritis, appendicitis, colitis, diverticulitis or any intraabdominal catastrophe. The patient will be started on antibiotics for the infection. Return if increasing pain, vomiting, fever, or inability to tolerate the oral antibiotic.  Follow up with primary physician is indicated if not improving in 2-3 days.  All questions were answered prior to discharge.  She and her  felt comfortable with this plan.      Diagnosis:    ICD-10-CM    1. Acute UTI  N39.0    2. RLQ abdominal pain  R10.31        Discharge Medications:  Discharge Medication List as of 3/17/2022  6:40 AM      START taking these medications    Details   cephALEXin (KEFLEX) 500 MG capsule Take 1 capsule (500 mg) by mouth 2 times daily for 7 days, Disp-14 capsule, R-0, E-Prescribe             Scribe Disclosure:  Patience MONTEMAYOR, am serving as a scribe at 4:45 AM on 3/17/2022 to document services personally performed by Shae Monte MD based on my observations and the provider's statements to me.              Shae Monte MD  03/18/22 0016

## 2022-03-17 NOTE — ED TRIAGE NOTES
Pt arrives to ED with RLQ pain that began earlier today and returned around 2230. Pt had a recent SBO and followed up with clinic today. Was told to come to ED if pain continued. Pt has scheduled colonoscopy today and has completed her bowel prep. Denies nausea or fever. C/o dizziness and dysuria. BP soft, pt states her BP is usually on the low side, around 90s/50s.

## 2022-05-15 ENCOUNTER — HEALTH MAINTENANCE LETTER (OUTPATIENT)
Age: 37
End: 2022-05-15

## 2022-09-11 ENCOUNTER — HEALTH MAINTENANCE LETTER (OUTPATIENT)
Age: 37
End: 2022-09-11

## 2022-12-22 ENCOUNTER — HOSPITAL ENCOUNTER (OUTPATIENT)
Dept: MRI IMAGING | Facility: CLINIC | Age: 37
Discharge: HOME | End: 2022-12-22
Attending: INTERNAL MEDICINE | Admitting: INTERNAL MEDICINE
Payer: COMMERCIAL

## 2022-12-22 DIAGNOSIS — Z87.19 HISTORY OF SMALL BOWEL OBSTRUCTION: ICD-10-CM

## 2022-12-22 PROCEDURE — 255N000002 HC RX 255 OP 636: Performed by: INTERNAL MEDICINE

## 2022-12-22 PROCEDURE — A9585 GADOBUTROL INJECTION: HCPCS | Performed by: INTERNAL MEDICINE

## 2022-12-22 PROCEDURE — 250N000011 HC RX IP 250 OP 636: Performed by: RADIOLOGY

## 2022-12-22 PROCEDURE — 72197 MRI PELVIS W/O & W/DYE: CPT

## 2022-12-22 RX ORDER — GADOBUTROL 604.72 MG/ML
7.5 INJECTION INTRAVENOUS ONCE
Status: COMPLETED | OUTPATIENT
Start: 2022-12-22 | End: 2022-12-22

## 2022-12-22 RX ADMIN — GLUCAGON HYDROCHLORIDE 0.5 MG: KIT at 08:57

## 2022-12-22 RX ADMIN — GADOBUTROL 5.5 ML: 604.72 INJECTION INTRAVENOUS at 09:26

## 2022-12-22 RX ADMIN — GLUCAGON HYDROCHLORIDE 0.5 MG: KIT at 09:20

## 2023-06-03 ENCOUNTER — HEALTH MAINTENANCE LETTER (OUTPATIENT)
Age: 38
End: 2023-06-03

## 2024-02-24 ENCOUNTER — HEALTH MAINTENANCE LETTER (OUTPATIENT)
Age: 39
End: 2024-02-24

## 2024-07-07 ENCOUNTER — HEALTH MAINTENANCE LETTER (OUTPATIENT)
Age: 39
End: 2024-07-07

## 2025-07-19 ENCOUNTER — HEALTH MAINTENANCE LETTER (OUTPATIENT)
Age: 40
End: 2025-07-19